# Patient Record
Sex: FEMALE | Race: WHITE | Employment: OTHER | URBAN - METROPOLITAN AREA
[De-identification: names, ages, dates, MRNs, and addresses within clinical notes are randomized per-mention and may not be internally consistent; named-entity substitution may affect disease eponyms.]

---

## 2017-04-21 PROBLEM — L40.3 PUSTULAR PSORIASIS OF PALMS AND SOLES: Status: ACTIVE | Noted: 2017-04-21

## 2017-08-22 NOTE — IMAGING NOTE
Pt is scheduled for LP with sedation for 8/28/17. Multiple msgs have been left on pt's vm since 8/17/17 by the PAT RN's, but no returned call. Pt does not have H&P on file in [facility].  I left her a voicemail today to verify if she had a H&P done by a physician

## 2017-08-24 NOTE — IMAGING NOTE
Spoke with patient. She has call into PCP to see if she can get addendum to most recent office visit. Otherwise she has scheduled apt tomorrow afternoon. I provided our fax and phone #'s.

## 2017-08-28 ENCOUNTER — HOSPITAL ENCOUNTER (OUTPATIENT)
Dept: GENERAL RADIOLOGY | Facility: HOSPITAL | Age: 57
Discharge: HOME | End: 2017-08-28
Attending: Other
Payer: COMMERCIAL

## 2017-08-28 ENCOUNTER — NURSE ONLY (OUTPATIENT)
Dept: LAB | Facility: HOSPITAL | Age: 57
End: 2017-08-28
Attending: Other
Payer: COMMERCIAL

## 2017-08-28 ENCOUNTER — APPOINTMENT (OUTPATIENT)
Dept: LAB | Facility: HOSPITAL | Age: 57
End: 2017-08-28
Attending: ORTHOPAEDIC SURGERY
Payer: COMMERCIAL

## 2017-08-28 VITALS
RESPIRATION RATE: 16 BRPM | TEMPERATURE: 98 F | HEIGHT: 68 IN | WEIGHT: 175 LBS | DIASTOLIC BLOOD PRESSURE: 70 MMHG | BODY MASS INDEX: 26.52 KG/M2 | HEART RATE: 58 BPM | SYSTOLIC BLOOD PRESSURE: 129 MMHG | OXYGEN SATURATION: 98 %

## 2017-08-28 DIAGNOSIS — G35 MS (MULTIPLE SCLEROSIS) (HCC): ICD-10-CM

## 2017-08-28 DIAGNOSIS — G35 MS (MULTIPLE SCLEROSIS) (HCC): Primary | ICD-10-CM

## 2017-08-28 LAB
ALBUMIN SERPL-MCNC: 4.1 G/DL (ref 3.5–4.8)
CLARITY CSF: CLEAR
COLOR CSF: COLORLESS
COUNT PERFORMED ON TUBE: 4
ERYTHROCYTE [DISTWIDTH] IN BLOOD BY AUTOMATED COUNT: 12.8 % (ref 11.5–16)
GLUCOSE CSF: 53 MG/DL (ref 40–70)
HCT VFR BLD AUTO: 40.8 % (ref 34–50)
HGB BLD-MCNC: 13.9 G/DL (ref 12–16)
IMMUNOGLOBULIN G: 1170 MG/DL (ref 791–1643)
MCH RBC QN AUTO: 30.9 PG (ref 27–33.2)
MCHC RBC AUTO-ENTMCNC: 34.1 G/DL (ref 31–37)
MCV RBC AUTO: 90.7 FL (ref 81–100)
PLATELET # BLD AUTO: 240 10(3)UL (ref 150–450)
RBC # BLD AUTO: 4.5 X10(6)UL (ref 3.8–5.1)
RBC CSF: 0 /MM3
RED CELL DISTRIBUTION WIDTH-SD: 42.1 FL (ref 35.1–46.3)
TOTAL PROTEIN CSF: 44.5 MG/DL (ref 15–45)
TOTAL VOLUME CSF: 8.5 ML
WBC # BLD AUTO: 8.3 X10(3) UL (ref 4–13)
WBC # FLD MANUAL: 1 /MM3 (ref 0–5)

## 2017-08-28 PROCEDURE — 62270 DX LMBR SPI PNXR: CPT | Performed by: OTHER

## 2017-08-28 PROCEDURE — 85027 COMPLETE CBC AUTOMATED: CPT | Performed by: RADIOLOGY

## 2017-08-28 PROCEDURE — 89050 BODY FLUID CELL COUNT: CPT | Performed by: OTHER

## 2017-08-28 PROCEDURE — 83883 ASSAY NEPHELOMETRY NOT SPEC: CPT | Performed by: ANESTHESIOLOGY

## 2017-08-28 PROCEDURE — 77003 FLUOROGUIDE FOR SPINE INJECT: CPT | Performed by: OTHER

## 2017-08-28 PROCEDURE — 36415 COLL VENOUS BLD VENIPUNCTURE: CPT

## 2017-08-28 PROCEDURE — 85610 PROTHROMBIN TIME: CPT

## 2017-08-28 PROCEDURE — 82945 GLUCOSE OTHER FLUID: CPT | Performed by: OTHER

## 2017-08-28 PROCEDURE — 84165 PROTEIN E-PHORESIS SERUM: CPT | Performed by: ANESTHESIOLOGY

## 2017-08-28 PROCEDURE — 86334 IMMUNOFIX E-PHORESIS SERUM: CPT | Performed by: ANESTHESIOLOGY

## 2017-08-28 PROCEDURE — 82040 ASSAY OF SERUM ALBUMIN: CPT | Performed by: OTHER

## 2017-08-28 PROCEDURE — 82042 OTHER SOURCE ALBUMIN QUAN EA: CPT | Performed by: ANESTHESIOLOGY

## 2017-08-28 PROCEDURE — 82784 ASSAY IGA/IGD/IGG/IGM EACH: CPT | Performed by: OTHER

## 2017-08-28 PROCEDURE — 82042 OTHER SOURCE ALBUMIN QUAN EA: CPT | Performed by: OTHER

## 2017-08-28 PROCEDURE — 82784 ASSAY IGA/IGD/IGG/IGM EACH: CPT | Performed by: ANESTHESIOLOGY

## 2017-08-28 PROCEDURE — 99152 MOD SED SAME PHYS/QHP 5/>YRS: CPT | Performed by: OTHER

## 2017-08-28 PROCEDURE — 84157 ASSAY OF PROTEIN OTHER: CPT | Performed by: ANESTHESIOLOGY

## 2017-08-28 PROCEDURE — 83916 OLIGOCLONAL BANDS: CPT | Performed by: ANESTHESIOLOGY

## 2017-08-28 PROCEDURE — 84157 ASSAY OF PROTEIN OTHER: CPT | Performed by: OTHER

## 2017-08-28 RX ORDER — NALOXONE HYDROCHLORIDE 0.4 MG/ML
80 INJECTION, SOLUTION INTRAMUSCULAR; INTRAVENOUS; SUBCUTANEOUS AS NEEDED
Status: DISCONTINUED | OUTPATIENT
Start: 2017-08-28 | End: 2017-09-01

## 2017-08-28 RX ORDER — MIDAZOLAM HYDROCHLORIDE 1 MG/ML
1 INJECTION INTRAMUSCULAR; INTRAVENOUS EVERY 5 MIN PRN
Status: ACTIVE | OUTPATIENT
Start: 2017-08-28 | End: 2017-08-28

## 2017-08-28 RX ORDER — ACETAMINOPHEN 500 MG
500 TABLET ORAL ONCE
Status: COMPLETED | OUTPATIENT
Start: 2017-08-28 | End: 2017-08-28

## 2017-08-28 RX ORDER — KETOROLAC TROMETHAMINE 30 MG/ML
30 INJECTION, SOLUTION INTRAMUSCULAR; INTRAVENOUS ONCE
Status: COMPLETED | OUTPATIENT
Start: 2017-08-28 | End: 2017-08-28

## 2017-08-28 RX ORDER — ACETAMINOPHEN 500 MG
TABLET ORAL
Status: DISPENSED
Start: 2017-08-28 | End: 2017-08-29

## 2017-08-28 RX ORDER — FLUMAZENIL 0.1 MG/ML
INJECTION, SOLUTION INTRAVENOUS
Status: DISCONTINUED
Start: 2017-08-28 | End: 2017-08-28 | Stop reason: WASHOUT

## 2017-08-28 RX ORDER — NALOXONE HYDROCHLORIDE 0.4 MG/ML
INJECTION, SOLUTION INTRAMUSCULAR; INTRAVENOUS; SUBCUTANEOUS
Status: DISCONTINUED
Start: 2017-08-28 | End: 2017-08-28 | Stop reason: WASHOUT

## 2017-08-28 RX ORDER — MIDAZOLAM HYDROCHLORIDE 1 MG/ML
INJECTION INTRAMUSCULAR; INTRAVENOUS
Status: COMPLETED
Start: 2017-08-28 | End: 2017-08-28

## 2017-08-28 RX ORDER — SODIUM CHLORIDE 9 MG/ML
INJECTION, SOLUTION INTRAVENOUS CONTINUOUS
Status: DISCONTINUED | OUTPATIENT
Start: 2017-08-28 | End: 2017-09-01

## 2017-08-28 RX ORDER — FLUMAZENIL 0.1 MG/ML
0.2 INJECTION, SOLUTION INTRAVENOUS AS NEEDED
Status: DISCONTINUED | OUTPATIENT
Start: 2017-08-28 | End: 2017-09-01

## 2017-08-28 RX ADMIN — SODIUM CHLORIDE: 9 INJECTION, SOLUTION INTRAVENOUS at 10:43:00

## 2017-08-28 RX ADMIN — KETOROLAC TROMETHAMINE 30 MG: 30 INJECTION, SOLUTION INTRAMUSCULAR; INTRAVENOUS at 13:14:00

## 2017-08-28 RX ADMIN — ACETAMINOPHEN 500 MG: 500 MG TABLET ORAL at 12:19:00

## 2017-08-28 RX ADMIN — MIDAZOLAM HYDROCHLORIDE 1 MG: 1 INJECTION INTRAMUSCULAR; INTRAVENOUS at 10:43:00

## 2017-08-28 NOTE — IMAGING NOTE
XR guided lumbar puncture with Dr. Bridgett Akers. Sedation per patient request. Vss. Pt tolerated well. Puncture site and bandage intact. Report to Moises dash RN. Transport to room 2249.

## 2017-08-28 NOTE — OR NURSING
12:45 PM - Upon assessing patient at [phone], patient rated pain 7/10. Patient states pain is a headache and has gotten worse since receiving Tylenol. LP site dry and intact.  Patient complains of light sensitivity, but does state this has been happening at [facility]

## 2017-08-29 LAB
ALBUMIN INDEX: 7.2 RATIO
ALBUMIN, CSF: 29 MG/DL
ALBUMIN, CSF: 31 MG/DL
ALBUMIN, SERUM/PLASMA, NEPH: 4290 MG/DL
CSF IGG SYNTHESIS RATE: <0 MG/D
CSF IGG/ALBUMIN RATIO: 0.13 RATIO
CSF IGG/ALBUMIN RATIO: 0.14 RATIO
IGG INDEX: 0.39 RATIO
IMMUNOGLOBULIN G CSF: 4.1 MG/DL
IMMUNOGLOBULIN G CSF: 4.1 MG/DL
IMMUNOGLOBULIN G: 1440 MG/DL

## 2017-08-30 LAB
A/G RATIO: 1.55
ALBUMIN, CSF: 24 MG/DL
ALBUMIN, SERUM: 4.68 G/DL (ref 3.5–4.8)
ALPHA-1 GLOBULIN: 0.15 G/DL (ref 0.1–0.3)
ALPHA-1, CSF: 1.1 MG/DL
ALPHA-2 GLOBULIN: 0.83 G/DL (ref 0.6–1)
ALPHA-2, CSF: 3.2 MG/DL
BETA GLOBULIN: 0.95 G/DL (ref 0.7–1.2)
BETA, CSF: 5.8 MG/DL
CSF BAND OLIGOCLONAL: POSITIVE
CSF OLIGOCLONAL BANDS NUMBER: 4 BANDS
GAMMA GLOBULIN: 1.08 G/DL (ref 0.6–1.6)
GAMMA, CSF: 3.5 MG/DL
KAPPA FREE LIGHT CHAIN: 2.46 MG/DL (ref 0.33–1.94)
KAPPA/LAMBDA FLC RATIO: 1.75 (ref 0.26–1.65)
LAMBDA FREE LIGHT CHAIN: 1.4 MG/DL (ref 0.57–2.63)
PRE-ALBUMIN, CSF: 1.3 MG/DL
TOTAL PROTEIN, CSF: 38.9 MG/DL
TOTAL PROTEIN,SERUM: 7.7 G/DL (ref 6.1–8.3)

## 2017-10-26 PROCEDURE — 88175 CYTOPATH C/V AUTO FLUID REDO: CPT | Performed by: OBSTETRICS & GYNECOLOGY

## 2017-10-26 PROCEDURE — 87624 HPV HI-RISK TYP POOLED RSLT: CPT | Performed by: OBSTETRICS & GYNECOLOGY

## 2017-10-26 PROCEDURE — 87086 URINE CULTURE/COLONY COUNT: CPT | Performed by: OBSTETRICS & GYNECOLOGY

## 2017-12-21 PROBLEM — M35.00 SECONDARY SJOGREN'S SYNDROME (HCC): Status: ACTIVE | Noted: 2017-12-21

## 2018-01-11 ENCOUNTER — OFFICE VISIT (OUTPATIENT)
Dept: NEUROLOGY | Facility: CLINIC | Age: 58
End: 2018-01-11

## 2018-01-11 ENCOUNTER — TELEPHONE (OUTPATIENT)
Dept: NEUROLOGY | Facility: CLINIC | Age: 58
End: 2018-01-11

## 2018-01-11 VITALS
RESPIRATION RATE: 16 BRPM | WEIGHT: 184 LBS | HEART RATE: 82 BPM | SYSTOLIC BLOOD PRESSURE: 133 MMHG | HEIGHT: 69 IN | BODY MASS INDEX: 27.25 KG/M2 | DIASTOLIC BLOOD PRESSURE: 79 MMHG

## 2018-01-11 DIAGNOSIS — G40.909 SEIZURE SYNDROME (HCC): Primary | ICD-10-CM

## 2018-01-11 DIAGNOSIS — G35 MS (MULTIPLE SCLEROSIS) (HCC): ICD-10-CM

## 2018-01-11 DIAGNOSIS — I77.6 VASCULITIS, CNS (HCC): ICD-10-CM

## 2018-01-11 PROCEDURE — 99204 OFFICE O/P NEW MOD 45 MIN: CPT | Performed by: OTHER

## 2018-01-11 RX ORDER — PSEUDOEPHEDRINE HYDROCHLORIDE 30 MG/1
30 TABLET ORAL EVERY 4 HOURS PRN
COMMUNITY

## 2018-01-11 NOTE — PROGRESS NOTES
[address] [place] [address] with [facility]  1/11/2018    2:55 PM      Cc:  Dizziness symptoms    HPI:  2014, had vertigo and had an MRI that showed white spots.  She meant feeling drunk it affected her balance and ling <=8.0 mg/d    CSF IGG/ALBUMIN RATIO      0.09 - 0.25 ratio    IGG INDEX      0.28 - 0.66 ratio    ALPHA-1, CSF      0.0 - 3.1 mg/dL    ALPHA-2, CSF      0.0 - 5.4 mg/dL    BETA, CSF      0.0 - 8.1 mg/dL    GAMMA, CSF      0.0 - 5.4 mg/dL    PRE-ALBUMIN, Take 25 mg by mouth every morning before breakfast., Disp: , Rfl:   •  ezetimibe 10 MG Oral Tab, Take 10 mg by mouth nightly., Disp: , Rfl:   •  Halobetasol Propionate 0.05 % External Cream, Apply topically 2 (two) times daily. 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

## 2018-01-11 NOTE — PATIENT INSTRUCTIONS
Refill policies:    • Allow 2-3 business days for refills; controlled substances may take longer.   • Contact your pharmacy at least 5 days prior to running out of medication and have them send an electronic request or submit request through the [facility] recommended that you have a procedure or additional testing performed. [facility]) will contact your insurance carrier to obtain pre-certification or prior authorization.     Unfortunately, [facility] has seen an increase in denial of paym

## 2018-01-18 ENCOUNTER — TELEPHONE (OUTPATIENT)
Dept: NEUROLOGY | Facility: CLINIC | Age: 58
End: 2018-01-18

## 2018-02-12 NOTE — TELEPHONE ENCOUNTER
MRI report reviewed, scanned to EPIC  Same periventricular spots seen on previous MRI     Dr Boo Roman

## 2018-04-23 ENCOUNTER — PATIENT MESSAGE (OUTPATIENT)
Dept: RHEUMATOLOGY | Facility: CLINIC | Age: 58
End: 2018-04-23

## 2018-04-23 NOTE — TELEPHONE ENCOUNTER
From: Rachel Jacobson  To: Bernardo Ramos MD  Sent: 4/23/2018 11:48 AM CDT  Subject: Other    Please cancel my appt on [email or web address].   Rachel Jacobson 4/18/1960

## 2018-04-30 ENCOUNTER — OFFICE VISIT (OUTPATIENT)
Dept: NEUROLOGY | Facility: CLINIC | Age: 58
End: 2018-04-30

## 2018-04-30 VITALS
WEIGHT: 184 LBS | HEART RATE: 88 BPM | HEIGHT: 69 IN | BODY MASS INDEX: 27.25 KG/M2 | SYSTOLIC BLOOD PRESSURE: 125 MMHG | DIASTOLIC BLOOD PRESSURE: 88 MMHG | RESPIRATION RATE: 16 BRPM

## 2018-04-30 DIAGNOSIS — M05.79 RHEUMATOID ARTHRITIS INVOLVING MULTIPLE SITES WITH POSITIVE RHEUMATOID FACTOR (HCC): Primary | ICD-10-CM

## 2018-04-30 DIAGNOSIS — G35 MS (MULTIPLE SCLEROSIS) (HCC): ICD-10-CM

## 2018-04-30 DIAGNOSIS — L40.50 PSORIATIC ARTHRITIS (HCC): ICD-10-CM

## 2018-04-30 PROCEDURE — 99214 OFFICE O/P EST MOD 30 MIN: CPT | Performed by: OTHER

## 2018-04-30 NOTE — PATIENT INSTRUCTIONS
Refill policies:    • Allow 2-3 business days for refills; controlled substances may take longer.   • Contact your pharmacy at least 5 days prior to running out of medication and have them send an electronic request or submit request through the “request re entire amount billed. Precertification and Prior Authorizations: If your physician has recommended that you have a procedure or additional testing performed.   [facility]) will contact your insurance carrier to obtain pre-certi

## 2018-04-30 NOTE — PROGRESS NOTES
[id number] [place] [address] with [facility]  4/30/2018    10:15 AM      Cc;  Possible MS    2-3 days at a time, she would not even get up. She would just come eat and not do anything and just be in bed.   Usually mental daily., Disp: 90 tablet, Rfl: 0  •  Cholecalciferol (VITAMIN D) 1000 UNITS Oral Tab, Take by mouth 2 (two) times daily. , Disp: , Rfl:   •  alprazolam 2 MG Oral Tab, Take 2 mg by mouth nightly as needed for Sleep., Disp: , Rfl:   •  TraMADol HCl 50 MG Oral mg/d    CSF IGG/ALBUMIN RATIO      0.09 - 0.25 ratio 0.14   IGG INDEX      0.28 - 0.66 ratio    CSF BAND OLIGOCLONAL          CSF OLIGOCLONAL BANDS NUMBER      0 - 1 Bands    CSF OLIG INTERPRETATION            Component      Latest Ref Rng & Units 8/28/201 were placed in this encounter.       RTC 3 months      Dr Mariia Garcias   Neurology   Director of the [facility]

## 2018-05-03 ENCOUNTER — TELEPHONE (OUTPATIENT)
Dept: NEUROLOGY | Facility: CLINIC | Age: 58
End: 2018-05-03

## 2018-05-03 NOTE — TELEPHONE ENCOUNTER
S: Calling with 2 incidents of lost time    B: LOV 4/30 with Dr. Gustavo Rogers with notes:    I will discuss situation with Rheumatology and Psychiatry  If needed, we do an empiric trial of Solumedrol 500 mg daily for 3 days and see what symptoms improves (fol

## 2018-05-08 ENCOUNTER — TELEPHONE (OUTPATIENT)
Dept: NEUROLOGY | Facility: CLINIC | Age: 58
End: 2018-05-08

## 2018-05-08 DIAGNOSIS — M05.79 RHEUMATOID ARTHRITIS INVOLVING MULTIPLE SITES WITH POSITIVE RHEUMATOID FACTOR (HCC): Primary | ICD-10-CM

## 2018-05-09 NOTE — TELEPHONE ENCOUNTER
Pt's 2nd attempt to speak with nurse/provider. Would like return call from RN states that it is an emergency that she speaks with nurse or physician.

## 2018-05-09 NOTE — TELEPHONE ENCOUNTER
Patient calling with updates since TE on 5/3/18. Patient reports has been falling a lot in the past 6 days and can not control legs, left more than right. Patient is very difficult to follow in a conversation and admits to noticing her speech difficulty.

## 2018-05-10 ENCOUNTER — NURSE ONLY (OUTPATIENT)
Dept: NEUROLOGY | Facility: CLINIC | Age: 58
End: 2018-05-10

## 2018-05-10 VITALS — HEART RATE: 76 BPM | SYSTOLIC BLOOD PRESSURE: 112 MMHG | DIASTOLIC BLOOD PRESSURE: 70 MMHG

## 2018-05-10 DIAGNOSIS — G89.4 CHRONIC PAIN SYNDROME: Primary | ICD-10-CM

## 2018-05-10 PROCEDURE — 96365 THER/PROPH/DIAG IV INF INIT: CPT | Performed by: OTHER

## 2018-05-10 RX ORDER — PREDNISONE 20 MG/1
TABLET ORAL
Qty: 30 TABLET | Refills: 0 | Status: SHIPPED | OUTPATIENT
Start: 2018-05-10 | End: 2018-06-08

## 2018-05-10 RX ORDER — METHYLPREDNISOLONE SODIUM SUCCINATE 500 MG/1
500 INJECTION, POWDER, FOR SOLUTION INTRAMUSCULAR; INTRAVENOUS ONCE
Status: COMPLETED | OUTPATIENT
Start: 2018-05-10 | End: 2018-05-10

## 2018-05-10 RX ADMIN — METHYLPREDNISOLONE SODIUM SUCCINATE 500 MG: 500 INJECTION, POWDER, FOR SOLUTION INTRAMUSCULAR; INTRAVENOUS at 14:18:00

## 2018-05-10 NOTE — TELEPHONE ENCOUNTER
Orders faxed to [facility], confirmation rec'd. Clarified with [facility] staff; patient will have 3:30 infusion tomorrow and 11:30 Saturday. LM with patient relaying times.

## 2018-05-10 NOTE — TELEPHONE ENCOUNTER
Per Dr. Leticia Holm patient will need to complete remaining 2 days' infusions at [facility]. Will request benefits check.

## 2018-05-10 NOTE — PROGRESS NOTES
IV started per Dr. Chanda Fleming in [facility] and Solu-Medrol infused over 20 minutes without difficulty. IV discontinued per RN. Patient tolerated infusion well with no complications.

## 2018-05-10 NOTE — TELEPHONE ENCOUNTER
Spoke to Keyla at [employer], codes -42615 are valid and billable, no prior authorization or predetermination required.  Call reference: [phone] call time 10:15

## 2018-05-10 NOTE — PATIENT INSTRUCTIONS
Refill policies:    • Allow 2-3 business days for refills; controlled substances may take longer.   • Contact your pharmacy at least 5 days prior to running out of medication and have them send an electronic request or submit request through the “request re entire amount billed. Precertification and Prior Authorizations: If your physician has recommended that you have a procedure or additional testing performed.   [facility]) will contact your insurance carrier to obtain pre-certi

## 2018-05-15 ENCOUNTER — TELEPHONE (OUTPATIENT)
Dept: NEUROLOGY | Facility: CLINIC | Age: 58
End: 2018-05-15

## 2018-05-15 NOTE — TELEPHONE ENCOUNTER
Noted would not have stopped it but give Ranitidine or pepcid. Can you call and ask what improved and what symptoms returned,.   Dr Tres Nagel

## 2018-05-15 NOTE — TELEPHONE ENCOUNTER
Patient advised to take pepcid. She states she was being optimistic about improvement, did have more energy after solumedrol but now is noticing speech is more slurred, foggy mind, and more difficulty walking.

## 2018-05-15 NOTE — TELEPHONE ENCOUNTER
Patient reports stomach pains and has stopped oral prednisone taper on Sunday. Reports symptoms have returned.

## 2018-05-16 NOTE — TELEPHONE ENCOUNTER
Tell her the biologic effect of steroid lingers for 30+ days and so just make note of symptoms and lets see how she is doing on her next visit.   Schedule follow up in 3 months    Dr Kacey Hidalgo

## 2018-05-16 NOTE — TELEPHONE ENCOUNTER
LMTCB. Upon call back, please relay doctors recommendations and assist patient in scheduling a follow up appointment.

## 2018-05-18 ENCOUNTER — TELEPHONE (OUTPATIENT)
Dept: NEUROLOGY | Facility: CLINIC | Age: 58
End: 2018-05-18

## 2018-05-18 DIAGNOSIS — R29.898 LEG WEAKNESS, BILATERAL: Primary | ICD-10-CM

## 2018-05-18 DIAGNOSIS — G37.9 DEMYELINATING DISEASE OF CENTRAL NERVOUS SYSTEM (HCC): ICD-10-CM

## 2018-05-18 NOTE — TELEPHONE ENCOUNTER
S/B: Symptoms that are unclear in origin; consulted with Dr. Homar Aldridge 4/30 with notes:    will discuss situation with Rheumatology and Psychiatry  If needed, we do an empiric trial of Solumedrol 500 mg daily for 3 days and see what symptoms improves (foll

## 2018-05-18 NOTE — TELEPHONE ENCOUNTER
Spoke with patient  Does not make sense  Non-response means NOT inflammatory   Maybe NOT MS even  Or maybe all psychological    Repeat MRI brain and Thoracic CORD  See her next week

## 2018-05-22 ENCOUNTER — TELEPHONE (OUTPATIENT)
Dept: NEUROLOGY | Facility: CLINIC | Age: 58
End: 2018-05-22

## 2018-05-24 NOTE — TELEPHONE ENCOUNTER
LMTCB to fill cancellation spot for tomorrow; slot not on hold and per message patient informed it is not on hold.

## 2018-05-30 NOTE — TELEPHONE ENCOUNTER
Patient never responded to call for appointment; when she returns call will fit in to appropriate open slot. Per Dr. Elizabeth Yi no need to double book at this time.

## 2018-06-01 ENCOUNTER — TELEPHONE (OUTPATIENT)
Dept: NEUROLOGY | Facility: CLINIC | Age: 58
End: 2018-06-01

## 2018-06-01 DIAGNOSIS — R29.898 BILATERAL LEG WEAKNESS: Primary | ICD-10-CM

## 2018-06-01 NOTE — TELEPHONE ENCOUNTER
Madison You wishing to complete MRI studies at [facility] but needs to have them done under IV sedation, per patient. Will confirm with Dr. Judson Toro that this is acceptable and place order.

## 2018-06-05 NOTE — TELEPHONE ENCOUNTER
Left message on Cici,central scheduling, voicemail stating the order was placed for MRI with sedation was placed and to contact the office if she needs further clarification. Provided office number.

## 2018-06-05 NOTE — TELEPHONE ENCOUNTER
Pt needs orders for MRI to state IV sedation, please call Cici at central scheduling to let her know when changes have been made so they can call pt to schedule

## 2018-06-08 RX ORDER — SODIUM CHLORIDE, SODIUM LACTATE, POTASSIUM CHLORIDE, CALCIUM CHLORIDE 600; 310; 30; 20 MG/100ML; MG/100ML; MG/100ML; MG/100ML
INJECTION, SOLUTION INTRAVENOUS CONTINUOUS
Status: CANCELLED | OUTPATIENT
Start: 2018-06-08

## 2018-06-08 RX ORDER — ACETAMINOPHEN 500 MG
1000 TABLET ORAL ONCE
Status: CANCELLED | OUTPATIENT
Start: 2018-06-08 | End: 2018-06-08

## 2018-06-22 ENCOUNTER — ANESTHESIA (OUTPATIENT)
Dept: MRI IMAGING | Facility: HOSPITAL | Age: 58
End: 2018-06-22
Payer: COMMERCIAL

## 2018-06-22 ENCOUNTER — ANESTHESIA EVENT (OUTPATIENT)
Dept: MRI IMAGING | Facility: HOSPITAL | Age: 58
End: 2018-06-22
Payer: COMMERCIAL

## 2018-06-22 ENCOUNTER — HOSPITAL ENCOUNTER (OUTPATIENT)
Dept: MRI IMAGING | Facility: HOSPITAL | Age: 58
Discharge: HOME | End: 2018-06-22
Attending: Other
Payer: COMMERCIAL

## 2018-06-22 ENCOUNTER — APPOINTMENT (OUTPATIENT)
Dept: LAB | Facility: HOSPITAL | Age: 58
End: 2018-06-22
Attending: Other
Payer: COMMERCIAL

## 2018-06-22 VITALS
WEIGHT: 182 LBS | RESPIRATION RATE: 18 BRPM | BODY MASS INDEX: 27.58 KG/M2 | TEMPERATURE: 98 F | DIASTOLIC BLOOD PRESSURE: 80 MMHG | HEART RATE: 82 BPM | OXYGEN SATURATION: 100 % | SYSTOLIC BLOOD PRESSURE: 116 MMHG | HEIGHT: 68 IN

## 2018-06-22 DIAGNOSIS — R29.898 BILATERAL LEG WEAKNESS: ICD-10-CM

## 2018-06-22 PROCEDURE — A9576 INJ PROHANCE MULTIPACK: HCPCS | Performed by: OTHER

## 2018-06-22 PROCEDURE — 70553 MRI BRAIN STEM W/O & W/DYE: CPT | Performed by: OTHER

## 2018-06-22 PROCEDURE — 72157 MRI CHEST SPINE W/O & W/DYE: CPT | Performed by: OTHER

## 2018-06-22 RX ORDER — HYDROMORPHONE HYDROCHLORIDE 1 MG/ML
0.4 INJECTION, SOLUTION INTRAMUSCULAR; INTRAVENOUS; SUBCUTANEOUS EVERY 5 MIN PRN
Status: ACTIVE | OUTPATIENT
Start: 2018-06-22 | End: 2018-06-22

## 2018-06-22 RX ORDER — ONDANSETRON 2 MG/ML
4 INJECTION INTRAMUSCULAR; INTRAVENOUS AS NEEDED
Status: ACTIVE | OUTPATIENT
Start: 2018-06-22 | End: 2018-06-22

## 2018-06-22 RX ORDER — HYDROCODONE BITARTRATE AND ACETAMINOPHEN 5; 325 MG/1; MG/1
2 TABLET ORAL AS NEEDED
Status: DISCONTINUED | OUTPATIENT
Start: 2018-06-22 | End: 2018-06-24

## 2018-06-22 RX ORDER — SODIUM CHLORIDE, SODIUM LACTATE, POTASSIUM CHLORIDE, CALCIUM CHLORIDE 600; 310; 30; 20 MG/100ML; MG/100ML; MG/100ML; MG/100ML
INJECTION, SOLUTION INTRAVENOUS CONTINUOUS
Status: DISCONTINUED | OUTPATIENT
Start: 2018-06-22 | End: 2018-06-24

## 2018-06-22 RX ORDER — HYDROCODONE BITARTRATE AND ACETAMINOPHEN 5; 325 MG/1; MG/1
1 TABLET ORAL AS NEEDED
Status: DISCONTINUED | OUTPATIENT
Start: 2018-06-22 | End: 2018-06-24

## 2018-06-22 RX ORDER — NALOXONE HYDROCHLORIDE 0.4 MG/ML
80 INJECTION, SOLUTION INTRAMUSCULAR; INTRAVENOUS; SUBCUTANEOUS AS NEEDED
Status: ACTIVE | OUTPATIENT
Start: 2018-06-22 | End: 2018-06-22

## 2018-06-22 RX ORDER — DEXAMETHASONE SODIUM PHOSPHATE 4 MG/ML
4 VIAL (ML) INJECTION AS NEEDED
Status: ACTIVE | OUTPATIENT
Start: 2018-06-22 | End: 2018-06-22

## 2018-06-22 RX ORDER — METOCLOPRAMIDE HYDROCHLORIDE 5 MG/ML
10 INJECTION INTRAMUSCULAR; INTRAVENOUS AS NEEDED
Status: ACTIVE | OUTPATIENT
Start: 2018-06-22 | End: 2018-06-22

## 2018-06-22 NOTE — ANESTHESIA POSTPROCEDURE EVALUATION
[address] Patient Status:  Outpatient   Age/Gender 63year old female MRN [id number]   Location [facility] MRI Attending Prince Liu MD   Hosp Day # 0 PCP Donnelly Saint, DO       Anesthesia Post-op Note    * No

## 2018-06-22 NOTE — ANESTHESIA PREPROCEDURE EVALUATION
PRE-OP EVALUATION    Patient Name: Brooklyn Coyle    Pre-op Diagnosis: * No pre-op diagnosis entered *    * No procedures listed *    * No surgeons found in log *    Pre-op vitals reviewed.   Temp: 97.6 °F (36.4 °C)  Pulse: 83  Resp: 21  BP: 112/85 Antibiotics      Anesthesia Evaluation    Patient summary reviewed. Anesthetic Complications           GI/Hepatic/Renal                                 Cardiovascular    Negative cardiovascular ROS.                                                    Endo

## 2018-06-22 NOTE — IMAGING NOTE
VS pre MRI stable on RA. Anesthesiologist discussed MRI anesthesia with patient and her spouse Agnes Paos, questions answered. Patient transported by MRI staff via cart with spouse at side.

## 2018-06-25 ENCOUNTER — TELEPHONE (OUTPATIENT)
Dept: NEUROLOGY | Facility: CLINIC | Age: 58
End: 2018-06-25

## 2018-06-28 ENCOUNTER — TELEPHONE (OUTPATIENT)
Dept: NEUROLOGY | Facility: CLINIC | Age: 58
End: 2018-06-28

## 2018-06-28 NOTE — TELEPHONE ENCOUNTER
Spoke with Dr Anushka Leach and also the patient      Current Outpatient Prescriptions:   •  BuPROPion HCl ER, SR, 150 MG Oral Tablet 12 Hr, TK 2 TS PO QAM AND TK 1 T PO QD AT 4PM, Disp: , Rfl: 1  •  Halobetasol Propionate 0.05 % External Ointment, Apply to affecte [address]  6/28/2018, Time completed 3:42 PM

## 2018-07-11 ENCOUNTER — OFFICE VISIT (OUTPATIENT)
Dept: NEUROLOGY | Facility: CLINIC | Age: 58
End: 2018-07-11

## 2018-07-11 VITALS
HEIGHT: 69 IN | RESPIRATION RATE: 16 BRPM | HEART RATE: 78 BPM | BODY MASS INDEX: 27.11 KG/M2 | SYSTOLIC BLOOD PRESSURE: 130 MMHG | WEIGHT: 183 LBS | DIASTOLIC BLOOD PRESSURE: 80 MMHG

## 2018-07-11 DIAGNOSIS — R83.8 OLIGOCLONAL BANDS IN CEREBROSPINAL FLUID: ICD-10-CM

## 2018-07-11 DIAGNOSIS — L40.9 PSORIASIS: ICD-10-CM

## 2018-07-11 DIAGNOSIS — R90.89 ABNORMAL FINDING ON MRI OF BRAIN: Primary | ICD-10-CM

## 2018-07-11 DIAGNOSIS — M06.032 RHEUMATOID ARTHRITIS INVOLVING BOTH WRISTS WITH NEGATIVE RHEUMATOID FACTOR (HCC): ICD-10-CM

## 2018-07-11 DIAGNOSIS — M06.031 RHEUMATOID ARTHRITIS INVOLVING BOTH WRISTS WITH NEGATIVE RHEUMATOID FACTOR (HCC): ICD-10-CM

## 2018-07-11 PROCEDURE — 99214 OFFICE O/P EST MOD 30 MIN: CPT | Performed by: OTHER

## 2018-07-11 NOTE — PROGRESS NOTES
[facility] with [facility]  Dennys Coyle  4/19/1960  Primary Care Provider:  Star Rondon DO    7/11/2018  Accompanied visit:  ( x) No () yes    63year old yo patient being seen fo needed for congestion. , Disp: , Rfl:   •  Acitretin 25 MG Oral Cap, Take 25 mg by mouth every morning before breakfast., Disp: , Rfl:   •  ezetimibe 10 MG Oral Tab, Take 10 mg by mouth nightly., Disp: , Rfl:   •  Halobetasol Propionate 0.05 % External Crea the diagnosis of demyelinating disease. It is hard to satisfy the Aiken criteria in making the diagnosis.   I encouraged her to get the second opinion from various MS expert who are in [facility] to see whether we can gather some consensus on the diagnosis when patient requests privacy

## 2018-07-11 NOTE — PATIENT INSTRUCTIONS
Refill policies:    • Allow 2-3 business days for refills; controlled substances may take longer.   • Contact your pharmacy at least 5 days prior to running out of medication and have them send an electronic request or submit request through the “request re entire amount billed. Precertification and Prior Authorizations: If your physician has recommended that you have a procedure or additional testing performed.   [facility]) will contact your insurance carrier to obtain pre-certi

## 2018-07-17 ENCOUNTER — APPOINTMENT (OUTPATIENT)
Dept: MRI IMAGING | Facility: HOSPITAL | Age: 58
End: 2018-07-17
Attending: Other
Payer: COMMERCIAL

## 2018-07-17 ENCOUNTER — HOSPITAL ENCOUNTER (OUTPATIENT)
Dept: MRI IMAGING | Facility: HOSPITAL | Age: 58
Discharge: HOME | End: 2018-07-17
Attending: Other
Payer: COMMERCIAL

## 2018-07-17 ENCOUNTER — APPOINTMENT (OUTPATIENT)
Dept: LAB | Facility: HOSPITAL | Age: 58
End: 2018-07-17
Attending: Other
Payer: COMMERCIAL

## 2018-07-18 ENCOUNTER — TELEPHONE (OUTPATIENT)
Dept: NEUROLOGY | Facility: CLINIC | Age: 58
End: 2018-07-18

## 2018-09-19 ENCOUNTER — TELEPHONE (OUTPATIENT)
Dept: NEUROLOGY | Facility: CLINIC | Age: 58
End: 2018-09-19

## 2018-10-19 ENCOUNTER — TELEPHONE (OUTPATIENT)
Dept: NEUROLOGY | Facility: CLINIC | Age: 58
End: 2018-10-19

## 2018-10-19 NOTE — TELEPHONE ENCOUNTER
Consult notes from [facility] Harmony MARTINEZ received, endorsed to Dr. Thuy Mcfarland for review.

## 2018-11-01 PROCEDURE — 87624 HPV HI-RISK TYP POOLED RSLT: CPT | Performed by: OBSTETRICS & GYNECOLOGY

## 2018-11-01 PROCEDURE — 88175 CYTOPATH C/V AUTO FLUID REDO: CPT | Performed by: OBSTETRICS & GYNECOLOGY

## 2018-12-03 NOTE — TELEPHONE ENCOUNTER
Spoke with Dr Desi Urena who agrees patient might have MS and treatment would then be Tecfidera. Since sh has possible Rheumatoid Arthritis, if she needs to be on treatment for this, he suggested Clement.   Otherwise Rituximab is an option or MTX 10 mg once

## 2018-12-05 ENCOUNTER — OFFICE VISIT (OUTPATIENT)
Dept: NEUROLOGY | Facility: CLINIC | Age: 58
End: 2018-12-05
Payer: COMMERCIAL

## 2018-12-05 VITALS
RESPIRATION RATE: 16 BRPM | HEIGHT: 69 IN | WEIGHT: 205 LBS | DIASTOLIC BLOOD PRESSURE: 82 MMHG | SYSTOLIC BLOOD PRESSURE: 125 MMHG | BODY MASS INDEX: 30.36 KG/M2 | HEART RATE: 82 BPM

## 2018-12-05 DIAGNOSIS — L40.50 PSORIATIC ARTHRITIS (HCC): ICD-10-CM

## 2018-12-05 DIAGNOSIS — M06.031 RHEUMATOID ARTHRITIS INVOLVING BOTH WRISTS WITH NEGATIVE RHEUMATOID FACTOR (HCC): ICD-10-CM

## 2018-12-05 DIAGNOSIS — G35 MS (MULTIPLE SCLEROSIS) (HCC): ICD-10-CM

## 2018-12-05 DIAGNOSIS — M06.032 RHEUMATOID ARTHRITIS INVOLVING BOTH WRISTS WITH NEGATIVE RHEUMATOID FACTOR (HCC): ICD-10-CM

## 2018-12-05 DIAGNOSIS — R83.8 OLIGOCLONAL BANDS IN CEREBROSPINAL FLUID: ICD-10-CM

## 2018-12-05 DIAGNOSIS — G37.9 DEMYELINATING DISEASE OF CENTRAL NERVOUS SYSTEM (HCC): Primary | ICD-10-CM

## 2018-12-05 PROCEDURE — 99214 OFFICE O/P EST MOD 30 MIN: CPT | Performed by: OTHER

## 2018-12-05 NOTE — PROGRESS NOTES
[id number] [place] [address] with [facility]  12/5/2018    4:29 PM      >50% of the time allotted for today's visit was spent on counseling and discussing options and care plan.        This is a disclosure care plan meet suffering from a painful tailbone at this time      /82 (BP Location: Left arm, Patient Position: Sitting, Cuff Size: adult)   Pulse 82   Resp 16   Ht 69\"   Wt 205 lb   LMP 03/19/2010   BMI 30.27 kg/m²    Appears stated age  HENT:  Pink conjunctiva,

## 2018-12-06 ENCOUNTER — TELEPHONE (OUTPATIENT)
Dept: NEUROLOGY | Facility: CLINIC | Age: 58
End: 2018-12-06

## 2018-12-06 NOTE — TELEPHONE ENCOUNTER
Patient presented to office for follow up visit and discussion of DMA. Patient to be started on Tecfidera. Prior authorization initiated, start up paperwork faxed to [employer]. Prior authorization initiated through cover my meds.     Case Key # [id number]

## 2018-12-11 NOTE — TELEPHONE ENCOUNTER
Fax received from [facility]. Patient's request for Tecfidera has been approved. Approval effective 12/6/2018 through 12/6/2020.     Start up forms faxed to [employer]

## 2018-12-12 ENCOUNTER — TELEPHONE (OUTPATIENT)
Dept: NEUROLOGY | Facility: CLINIC | Age: 58
End: 2018-12-12

## 2018-12-12 DIAGNOSIS — G35 MS (MULTIPLE SCLEROSIS) (HCC): Primary | ICD-10-CM

## 2018-12-12 RX ORDER — DIMETHYL FUMARATE 120 MG/1
120 CAPSULE ORAL 2 TIMES DAILY
Qty: 14 CAPSULE | Refills: 0 | Status: SHIPPED | OUTPATIENT
Start: 2018-12-12 | End: 2018-12-19

## 2018-12-12 RX ORDER — DIMETHYL FUMARATE 240 MG/1
240 CAPSULE ORAL 2 TIMES DAILY
Qty: 60 CAPSULE | Refills: 0 | Status: SHIPPED | OUTPATIENT
Start: 2018-12-12 | End: 2018-12-26

## 2018-12-12 NOTE — TELEPHONE ENCOUNTER
Pt called asking for Tecfidera to be ordered and sent to [facility].   Order is pending Dr. Kermit Lackey approval    Routing to Dr. Gilda Jarvis

## 2018-12-21 ENCOUNTER — TELEPHONE (OUTPATIENT)
Dept: NEUROLOGY | Facility: CLINIC | Age: 58
End: 2018-12-21

## 2018-12-26 DIAGNOSIS — G35 MS (MULTIPLE SCLEROSIS) (HCC): ICD-10-CM

## 2018-12-26 RX ORDER — DIMETHYL FUMARATE 240 MG/1
240 CAPSULE ORAL 2 TIMES DAILY
Qty: 60 CAPSULE | Refills: 2 | Status: SHIPPED | OUTPATIENT
Start: 2018-12-26 | End: 2019-01-25

## 2018-12-26 NOTE — TELEPHONE ENCOUNTER
Incoming fax asking for refill on Tecfidera, both 120 & 240mg dosing. Will sent up Rx for 240mg, 120mg was initial dosing for first 7 days.      Medication: Tecfidera     Date of last refill: 12/12/18 for #60/0 additional refills  [place]

## 2019-04-02 ENCOUNTER — OFFICE VISIT (OUTPATIENT)
Dept: NEUROLOGY | Facility: CLINIC | Age: 59
End: 2019-04-02
Payer: COMMERCIAL

## 2019-04-02 VITALS
HEIGHT: 69 IN | DIASTOLIC BLOOD PRESSURE: 77 MMHG | HEART RATE: 78 BPM | WEIGHT: 205 LBS | RESPIRATION RATE: 16 BRPM | SYSTOLIC BLOOD PRESSURE: 130 MMHG | BODY MASS INDEX: 30.36 KG/M2

## 2019-04-02 DIAGNOSIS — M06.9 RHEUMATOID ARTHRITIS INVOLVING MULTIPLE JOINTS (HCC): ICD-10-CM

## 2019-04-02 DIAGNOSIS — L40.50 PSORIATIC ARTHRITIS (HCC): ICD-10-CM

## 2019-04-02 DIAGNOSIS — M79.7 FIBROMYALGIA: Primary | ICD-10-CM

## 2019-04-02 DIAGNOSIS — G37.9 DEMYELINATING DISEASE OF CENTRAL NERVOUS SYSTEM (HCC): ICD-10-CM

## 2019-04-02 DIAGNOSIS — L40.9 PSORIASIS: ICD-10-CM

## 2019-04-02 PROCEDURE — 99214 OFFICE O/P EST MOD 30 MIN: CPT | Performed by: OTHER

## 2019-04-02 RX ORDER — DIMETHYL FUMARATE 240 MG/1
1 CAPSULE ORAL 2 TIMES DAILY
COMMUNITY
End: 2019-08-27

## 2019-04-02 NOTE — PROGRESS NOTES
[employer] with [facility]  4/19/1960  Primary Care Provider:  Delma Dawson DO    4/2/2019  Accompanied visit:  ( ) No (x) yes, by: .   PSR is nearby during the daily, Disp: , Rfl: 3  •  VYVANSE 70 MG Oral Cap, TK ONE C PO  QAM, Disp: , Rfl: 0  •  TraMADol HCl 50 MG Oral Tab, TAKE 1 TABLET BY MOUTH EVERY 3 HOURS AS NEEDED FOR PAIN, Disp: , Rfl:   •  Pseudoephedrine HCl 30 MG Oral Tab, Take 30 mg by mouth every 4 ( Elroy, he recommended using Tecfidera with the Leflunomide    Diagnostics/Orders:  Schedule EMG of arms and legs      (x) Discussed potential side effects of any treatment relevant to above.     Follow up, in approximately:  ( ) 6-8 weeks    ( ) 3-4 mon

## 2019-07-29 ENCOUNTER — TELEPHONE (OUTPATIENT)
Dept: NEUROLOGY | Facility: CLINIC | Age: 59
End: 2019-07-29

## 2019-07-29 NOTE — TELEPHONE ENCOUNTER
Pt scheduled 4 ext EMG for 8/20/19. She no showed in April because she was not aware of the appt. She is having severe spasms and is falling down frequently.   Wants to know if she should come int to see Dr. Delinda Angelucci or see the [facility] specialist he had ref

## 2019-07-29 NOTE — TELEPHONE ENCOUNTER
Talked with Ever Reyes. In Last 6 months her symptoms are getting worse. She is c/o of spasm and jerking in both her legs. She cannot stand still. She keeps falling backward on the floor. She states \" she cannot control her limbs\".  She cannot see clearly f

## 2019-07-30 NOTE — TELEPHONE ENCOUNTER
Spoke wih patient, does not want steroid, suggested IVIG but laughed when I told her it is an expensive treatment  Advised to keep appointment on August

## 2019-08-16 ENCOUNTER — TELEPHONE (OUTPATIENT)
Dept: NEUROLOGY | Facility: CLINIC | Age: 59
End: 2019-08-16

## 2019-08-16 NOTE — TELEPHONE ENCOUNTER
Left 2nd message cancelling pt's 8/20/19 EMG test due to provider schedule change. Please CB on Monday to r/s with another provider.

## 2019-08-22 ENCOUNTER — TELEPHONE (OUTPATIENT)
Dept: NEUROLOGY | Facility: CLINIC | Age: 59
End: 2019-08-22

## 2019-08-22 NOTE — TELEPHONE ENCOUNTER
Received surgical clearance form in regards to MS and seizures. Pt having posterior colporraphy on 8/28/19. Form placed in RN bin.

## 2019-08-23 PROCEDURE — 87086 URINE CULTURE/COLONY COUNT: CPT | Performed by: INTERNAL MEDICINE

## 2019-08-23 NOTE — TELEPHONE ENCOUNTER
Per Jenni: please clarify patient's seizure episodes. Patient reports \"episodes\" that are non-epileptic. They are continuing to occur but she has not had any for over a week. She does not have any medication to treat these conditions.     Sometimes

## 2019-08-26 NOTE — TELEPHONE ENCOUNTER
Pt is having surgery Wednesday, waiting for clearance letter to [address] [place] [address], please update [phone]

## 2019-08-26 NOTE — TELEPHONE ENCOUNTER
Received clearance letter for surgery from Dr Gino Alston. Faxed to [phone] to [address] [place] [address]. Attn: Shahab Garcia. Fax confirmation received. Copy of letter send for scanning.

## 2019-08-30 ENCOUNTER — TELEPHONE (OUTPATIENT)
Dept: NEUROLOGY | Facility: CLINIC | Age: 59
End: 2019-08-30

## 2019-08-30 NOTE — TELEPHONE ENCOUNTER
Pt is having surgery (colporraphy) on 9/11/19. Is scheduled for 4 ext EMG with Dr. Geno Watkins on 9/12/19. Asking if we have a sooner appointment available.   Advised pt nothing sooner on his schedule but recommended she reschedule to the week of September 2

## 2019-09-12 ENCOUNTER — TELEPHONE (OUTPATIENT)
Dept: NEUROLOGY | Facility: CLINIC | Age: 59
End: 2019-09-12

## 2019-09-12 NOTE — TELEPHONE ENCOUNTER
Spoke with patient's , patient was extremely exhausted prior to procedure at [facility] on 9/11/2019 and had a seizure prior to procedure. Patient went to ER. After resting all night, patient's spouse reports that patient is feeling much better.

## 2019-09-12 NOTE — TELEPHONE ENCOUNTER
Patient's spouse called and RN informed him of the following information;     Note      Yes Dr Stef Washington called me and we decided to put her on low dose Keppra 250 mg BID  Attempted to call Lanie Barkley but got voicemail  I suspect that it was a precipitated event li

## 2019-09-12 NOTE — TELEPHONE ENCOUNTER
Yes Dr Sumanth Wilson called me and we decided to put her on low dose Keppra 250 mg BID  Attempted to call Rafaela Mueller but got voicemail  I suspect that it was a precipitated event like sleep deprivation  I will reach out today and get more details   Decision is whether

## 2019-09-24 ENCOUNTER — TELEPHONE (OUTPATIENT)
Dept: NEUROLOGY | Facility: CLINIC | Age: 59
End: 2019-09-24

## 2019-10-21 ENCOUNTER — TELEPHONE (OUTPATIENT)
Dept: NEUROLOGY | Facility: CLINIC | Age: 59
End: 2019-10-21

## 2019-10-21 NOTE — TELEPHONE ENCOUNTER
Spoke with the patient. She is frustrated that she continues to have symptoms. She only took Tecfidera for 1 1/2 month,  She is still on Leflunomide which is metabolized into Teriflunomide (Aubagio).   She did not have good experience with steroid,  She i

## 2019-12-02 ENCOUNTER — PROCEDURE VISIT (OUTPATIENT)
Dept: NEUROLOGY | Facility: CLINIC | Age: 59
End: 2019-12-02
Payer: COMMERCIAL

## 2019-12-02 ENCOUNTER — TELEPHONE (OUTPATIENT)
Dept: NEUROLOGY | Facility: CLINIC | Age: 59
End: 2019-12-02

## 2019-12-02 DIAGNOSIS — R29.898 BILATERAL LEG WEAKNESS: ICD-10-CM

## 2019-12-02 PROCEDURE — 95910 NRV CNDJ TEST 7-8 STUDIES: CPT | Performed by: OTHER

## 2019-12-02 PROCEDURE — 95885 MUSC TST DONE W/NERV TST LIM: CPT | Performed by: OTHER

## 2019-12-02 RX ORDER — LEVETIRACETAM 500 MG/1
500 TABLET ORAL 2 TIMES DAILY
Qty: 60 TABLET | Refills: 5 | Status: SHIPPED | OUTPATIENT
Start: 2019-12-02 | End: 2019-12-04

## 2019-12-02 NOTE — TELEPHONE ENCOUNTER
She is ok at this time but she stated we need to call her pharmacy because this is the first time she is using them

## 2019-12-02 NOTE — TELEPHONE ENCOUNTER
RN called the patient to verify what she needing regarding the pharmacy. Pt verified she needs her Keppra called into the  [address] RN informed the MD that he needs to order the medication.

## 2019-12-02 NOTE — TELEPHONE ENCOUNTER
Called pt to let her know she left her brown book in the lobby area. Placed book in medication/money drawer.

## 2019-12-02 NOTE — PROGRESS NOTES
[address] [place] [address] with [facility]  12/2/2019    1:53 PM      Here for EMG and sampled the right arm and leg which were all normal  Symptoms of numbness in legs leading to inability to feel and therefore somet Tab, Take 10 mg by mouth nightly., Disp: , Rfl:   •  Cholecalciferol (VITAMIN D) 1000 UNITS Oral Tab, Take by mouth 2 (two) times daily. , Disp: , Rfl:   •  alprazolam 2 MG Oral Tab, Take 2 mg by mouth nightly as needed for Sleep., Disp: , Rfl:   •  Albuter

## 2019-12-04 RX ORDER — LEVETIRACETAM 500 MG/1
500 TABLET ORAL 2 TIMES DAILY
Qty: 180 TABLET | Refills: 1 | Status: SHIPPED | OUTPATIENT
Start: 2019-12-04 | End: 2020-06-22

## 2019-12-09 NOTE — PROCEDURES
[facility] with [address]  [phone]    Fax  [phone]    Electrophysiologic Consultation      Patient: Dl Lindquist

## 2020-02-28 ENCOUNTER — TELEPHONE (OUTPATIENT)
Dept: NEUROLOGY | Facility: CLINIC | Age: 60
End: 2020-02-28

## 2020-02-28 NOTE — TELEPHONE ENCOUNTER
S: patient is c/o about sleeping problem. B: Advised to discuss with Dr Baljeet Morales about Leflunomide.   Upon consultation with Dr Reynold Bolden, he recommended using Tecfidera with the Leflunomide     A: patient is having trouble sleeping, she does not remember w

## 2020-03-18 ENCOUNTER — TELEPHONE (OUTPATIENT)
Dept: NEUROLOGY | Facility: CLINIC | Age: 60
End: 2020-03-18

## 2020-03-18 DIAGNOSIS — G37.9 DEMYELINATING DISEASE (HCC): Primary | ICD-10-CM

## 2020-03-18 NOTE — TELEPHONE ENCOUNTER
Pt stated she needs new orders for her annual MRI's, not sure which she needs, she also uses sedation during MRI and wants to do the MRI's at [facility]

## 2020-03-20 ENCOUNTER — TELEPHONE (OUTPATIENT)
Dept: NEUROLOGY | Facility: CLINIC | Age: 60
End: 2020-03-20

## 2020-03-20 NOTE — TELEPHONE ENCOUNTER
Patient will call us back to let us know were she is having the MRI not sure if  [facility] or [facility].

## 2020-03-27 ENCOUNTER — TELEPHONE (OUTPATIENT)
Dept: NEUROLOGY | Facility: CLINIC | Age: 60
End: 2020-03-27

## 2020-04-21 ENCOUNTER — TELEPHONE (OUTPATIENT)
Dept: NEUROLOGY | Facility: CLINIC | Age: 60
End: 2020-04-21

## 2020-04-23 NOTE — TELEPHONE ENCOUNTER
Converted pt;s 4/28/20 OV to a phone visit. She declined the video option. Requested we mail her notebook she had previously left behind. Put notebook in mail for 4/24/20.

## 2020-04-28 ENCOUNTER — VIRTUAL PHONE E/M (OUTPATIENT)
Dept: NEUROLOGY | Facility: CLINIC | Age: 60
End: 2020-04-28
Payer: COMMERCIAL

## 2020-04-30 ENCOUNTER — VIRTUAL PHONE E/M (OUTPATIENT)
Dept: NEUROLOGY | Facility: CLINIC | Age: 60
End: 2020-04-30
Payer: COMMERCIAL

## 2020-04-30 DIAGNOSIS — M79.7 FIBROMYALGIA: ICD-10-CM

## 2020-04-30 DIAGNOSIS — M06.031 RHEUMATOID ARTHRITIS INVOLVING BOTH WRISTS WITH NEGATIVE RHEUMATOID FACTOR (HCC): Primary | ICD-10-CM

## 2020-04-30 DIAGNOSIS — M06.032 RHEUMATOID ARTHRITIS INVOLVING BOTH WRISTS WITH NEGATIVE RHEUMATOID FACTOR (HCC): Primary | ICD-10-CM

## 2020-04-30 DIAGNOSIS — G37.9 DEMYELINATING DISEASE OF CENTRAL NERVOUS SYSTEM (HCC): ICD-10-CM

## 2020-04-30 DIAGNOSIS — R90.89 ABNORMAL FINDING ON MRI OF BRAIN: ICD-10-CM

## 2020-04-30 DIAGNOSIS — R83.8 OLIGOCLONAL BANDS IN CEREBROSPINAL FLUID: ICD-10-CM

## 2020-04-30 PROCEDURE — 99213 OFFICE O/P EST LOW 20 MIN: CPT | Performed by: OTHER

## 2020-04-30 NOTE — PROGRESS NOTES
Virtual Telephone Check-In  Called and no answer twice    Nguyen Fong MD  Vascular & General Neurology  Director, Multiple Sclerosis Program  [facility]  4/30/2020, Time completed 10:02 AM

## 2020-04-30 NOTE — PROGRESS NOTES
Virtual Telephone Check-In    [facility] verbally consents to a Virtual/Telephone Check-In visit on 04/30/20.     Patient understands and accepts financial responsibility for any deductible, co-insurance and/or co-pays associated with this serv PO  QHS, Disp: , Rfl: 3  •  fluorometholone 0.1 % Ophthalmic Suspension, Place 1 drop into both eyes 3 (three) times daily. , Disp: 1 Bottle, Rfl: 0  •  BuPROPion HCl ER, SR, 150 MG Oral Tablet 12 Hr, TK 2 TS PO QAM AND TK 1 T PO QD AT 4PM, Disp: , Rfl: 1 around  Balance is truly very off.       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

## 2020-06-08 ENCOUNTER — HOSPITAL ENCOUNTER (OUTPATIENT)
Dept: MRI IMAGING | Facility: HOSPITAL | Age: 60
Discharge: HOME | End: 2020-06-08
Attending: Other
Payer: COMMERCIAL

## 2020-06-16 VITALS — BODY MASS INDEX: 28.14 KG/M2 | HEIGHT: 69 IN | WEIGHT: 190 LBS

## 2020-06-16 RX ORDER — SODIUM CHLORIDE, SODIUM LACTATE, POTASSIUM CHLORIDE, CALCIUM CHLORIDE 600; 310; 30; 20 MG/100ML; MG/100ML; MG/100ML; MG/100ML
INJECTION, SOLUTION INTRAVENOUS CONTINUOUS
Status: CANCELLED | OUTPATIENT
Start: 2020-06-16

## 2020-06-19 ENCOUNTER — TELEPHONE (OUTPATIENT)
Dept: CT IMAGING | Facility: HOSPITAL | Age: 60
End: 2020-06-19

## 2020-06-19 NOTE — TELEPHONE ENCOUNTER
Pt sees Dr. Kenji Burnett in [facility]. Wrong contact info was in epic and corrected by this RN. Pt sts saw hime 6/17 for H&P. LM w his office requesting fax of H&P. Phone and Fax # given. Pt has MRI w anesthesia on 6/23 and H&P needed.

## 2020-06-20 ENCOUNTER — LAB ENCOUNTER (OUTPATIENT)
Dept: LAB | Facility: HOSPITAL | Age: 60
End: 2020-06-20
Attending: Other
Payer: COMMERCIAL

## 2020-06-20 DIAGNOSIS — Z01.818 OTHER SPECIFIED PRE-OPERATIVE EXAMINATION: ICD-10-CM

## 2020-06-20 DIAGNOSIS — Z11.59 ENCOUNTER FOR SCREENING FOR OTHER VIRAL DISEASES: ICD-10-CM

## 2020-06-22 RX ORDER — LEVETIRACETAM 500 MG/1
TABLET ORAL
Qty: 180 TABLET | Refills: 3 | Status: SHIPPED | OUTPATIENT
Start: 2020-06-22 | End: 2021-06-18

## 2020-06-22 NOTE — TELEPHONE ENCOUNTER
Medication: levetiracetam    Date of last refill: 12/4/19  [place]    Last office visit: 4/30/20  Due back to clinic per last office note:  8 months  Date next office visit scheduled:    Future Appointments   Date Charles Francis

## 2020-06-23 ENCOUNTER — HOSPITAL ENCOUNTER (OUTPATIENT)
Dept: MRI IMAGING | Facility: HOSPITAL | Age: 60
Discharge: HOME | End: 2020-06-23
Attending: Other
Payer: COMMERCIAL

## 2020-07-17 ENCOUNTER — TELEPHONE (OUTPATIENT)
Dept: NEUROLOGY | Facility: CLINIC | Age: 60
End: 2020-07-17

## 2020-07-17 DIAGNOSIS — G37.9 DEMYELINATING DISEASE OF CENTRAL NERVOUS SYSTEM (HCC): Primary | ICD-10-CM

## 2020-07-17 NOTE — TELEPHONE ENCOUNTER
S: Pt had a seizure. B: Dx: MS and Rheumatoid Arthritis    A: Pt had a seizure 2 weeks ago in the bathroom and blacked out. Pt has fallen out of her bed multiple times. Pt concerned she is having seizures in her sleep.  Pt is in severe pain and looking f

## 2020-07-17 NOTE — TELEPHONE ENCOUNTER
Current Outpatient Medications:   •  LEVETIRACETAM 500 MG Oral Tab, TAKE 1 TABLET TWICE A DAY, Disp: 180 tablet, Rfl: 3  •  pregabalin 150 MG Oral Cap, Take 1 capsule (150 mg total) by mouth 3 (three) times daily. , Disp: 270 capsule, Rfl: 1  •  leflunomi relates that she has not been sleeping well and then had \"seizures\" and I told her the simplest remedy today is to increase the Keppra to 750 mg BID and advised to make an appointment so I can do a physical examination.   Also offered alternative to go to

## 2020-08-27 ENCOUNTER — OFFICE VISIT (OUTPATIENT)
Dept: NEUROLOGY | Facility: CLINIC | Age: 60
End: 2020-08-27
Payer: COMMERCIAL

## 2020-08-27 VITALS
DIASTOLIC BLOOD PRESSURE: 78 MMHG | RESPIRATION RATE: 16 BRPM | WEIGHT: 190 LBS | HEIGHT: 69 IN | SYSTOLIC BLOOD PRESSURE: 126 MMHG | TEMPERATURE: 98 F | BODY MASS INDEX: 28.14 KG/M2 | HEART RATE: 74 BPM

## 2020-08-27 DIAGNOSIS — G37.9 DEMYELINATING DISEASE OF CENTRAL NERVOUS SYSTEM (HCC): Primary | ICD-10-CM

## 2020-08-27 DIAGNOSIS — R56.9 SEIZURES (HCC): ICD-10-CM

## 2020-08-27 DIAGNOSIS — M06.032 RHEUMATOID ARTHRITIS INVOLVING BOTH WRISTS WITH NEGATIVE RHEUMATOID FACTOR (HCC): ICD-10-CM

## 2020-08-27 DIAGNOSIS — M06.031 RHEUMATOID ARTHRITIS INVOLVING BOTH WRISTS WITH NEGATIVE RHEUMATOID FACTOR (HCC): ICD-10-CM

## 2020-08-27 PROCEDURE — 3008F BODY MASS INDEX DOCD: CPT | Performed by: OTHER

## 2020-08-27 PROCEDURE — 99214 OFFICE O/P EST MOD 30 MIN: CPT | Performed by: OTHER

## 2020-08-27 PROCEDURE — 3078F DIAST BP <80 MM HG: CPT | Performed by: OTHER

## 2020-08-27 PROCEDURE — 3074F SYST BP LT 130 MM HG: CPT | Performed by: OTHER

## 2020-08-27 RX ORDER — HYDROCODONE BITARTRATE AND ACETAMINOPHEN 10; 325 MG/1; MG/1
1 TABLET ORAL EVERY 6 HOURS PRN
COMMUNITY
End: 2021-05-12

## 2020-08-27 NOTE — PROGRESS NOTES
[facility] with [address]  4/19/1960  Primary Care Provider:  No primary care provider on file.     8/27/2020  Accompanied visit:      () No.        62year old yo SR, 150 MG Oral Tablet 12 Hr, TK 2 TS PO QAM AND TK 1 T PO QD AT 4PM, Disp: , Rfl: 1  •  Halobetasol Propionate 0.05 % External Ointment, Apply to affected area daily, Disp: , Rfl: 3  •  VYVANSE 50 MG Oral Cap, Take 50 mg by mouth every morning.  , Disp: , assessment as long as she is getting it under IV sedation. We will also get her Keppra level and readjust the dose accordingly.     For as long as she is on leflunomide, this is similar to teriflunomide which should exert a disease modifying effect on he

## 2020-09-22 ENCOUNTER — APPOINTMENT (OUTPATIENT)
Dept: LAB | Age: 60
End: 2020-09-22
Attending: Other
Payer: COMMERCIAL

## 2020-09-22 DIAGNOSIS — Z01.818 OTHER SPECIFIED PRE-OPERATIVE EXAMINATION: ICD-10-CM

## 2020-09-22 DIAGNOSIS — Z11.59 SPECIAL SCREENING EXAMINATION FOR VIRAL DISEASE: ICD-10-CM

## 2020-09-22 NOTE — IMAGING NOTE
Called patient and left a voicemail notifying patient that she does not have history and physical with in 30 days for the MRI with anesthesia  scheduled  For 9/24/20.  Call back number provided for clarification

## 2020-09-23 ENCOUNTER — ANESTHESIA EVENT (OUTPATIENT)
Dept: MRI IMAGING | Facility: HOSPITAL | Age: 60
End: 2020-09-23
Payer: COMMERCIAL

## 2020-09-23 LAB — SARS-COV-2 RNA RESP QL NAA+PROBE: NOT DETECTED

## 2020-09-24 ENCOUNTER — TELEPHONE (OUTPATIENT)
Dept: NEUROLOGY | Facility: CLINIC | Age: 60
End: 2020-09-24

## 2020-09-24 ENCOUNTER — HOSPITAL ENCOUNTER (OUTPATIENT)
Dept: MRI IMAGING | Facility: HOSPITAL | Age: 60
Discharge: HOME | End: 2020-09-24
Attending: Other
Payer: COMMERCIAL

## 2020-09-24 ENCOUNTER — ANESTHESIA (OUTPATIENT)
Dept: MRI IMAGING | Facility: HOSPITAL | Age: 60
End: 2020-09-24
Payer: COMMERCIAL

## 2020-09-24 ENCOUNTER — NURSE ONLY (OUTPATIENT)
Dept: LAB | Facility: HOSPITAL | Age: 60
End: 2020-09-24
Attending: Other
Payer: COMMERCIAL

## 2020-09-24 VITALS
DIASTOLIC BLOOD PRESSURE: 84 MMHG | OXYGEN SATURATION: 94 % | SYSTOLIC BLOOD PRESSURE: 128 MMHG | HEART RATE: 94 BPM | TEMPERATURE: 98 F | RESPIRATION RATE: 18 BRPM

## 2020-09-24 VITALS
TEMPERATURE: 97 F | SYSTOLIC BLOOD PRESSURE: 124 MMHG | HEART RATE: 78 BPM | DIASTOLIC BLOOD PRESSURE: 75 MMHG | OXYGEN SATURATION: 95 % | RESPIRATION RATE: 16 BRPM

## 2020-09-24 DIAGNOSIS — M06.9 RHEUMATOID ARTHRITIS INVOLVING MULTIPLE JOINTS (HCC): ICD-10-CM

## 2020-09-24 DIAGNOSIS — G37.9 DEMYELINATING DISEASE OF CENTRAL NERVOUS SYSTEM (HCC): ICD-10-CM

## 2020-09-24 DIAGNOSIS — Z79.899 ENCOUNTER FOR LONG-TERM (CURRENT) USE OF HIGH-RISK MEDICATION: ICD-10-CM

## 2020-09-24 DIAGNOSIS — R56.9 SEIZURES (HCC): ICD-10-CM

## 2020-09-24 DIAGNOSIS — E55.9 VITAMIN D DEFICIENCY: ICD-10-CM

## 2020-09-24 LAB
ALBUMIN SERPL-MCNC: 3.8 G/DL (ref 3.4–5)
ALP LIVER SERPL-CCNC: 130 U/L
ALT SERPL-CCNC: 35 U/L
AST SERPL-CCNC: 22 U/L (ref 15–37)
BASOPHILS # BLD AUTO: 0.1 X10(3) UL (ref 0–0.2)
BASOPHILS NFR BLD AUTO: 1.7 %
BILIRUB DIRECT SERPL-MCNC: 0.1 MG/DL (ref 0–0.2)
BILIRUB SERPL-MCNC: 0.5 MG/DL (ref 0.1–2)
BUN BLD-MCNC: 14 MG/DL (ref 7–18)
CREAT BLD-MCNC: 1.1 MG/DL
CRP SERPL-MCNC: <0.29 MG/DL (ref ?–0.3)
DEPRECATED RDW RBC AUTO: 44.9 FL (ref 35.1–46.3)
EOSINOPHIL # BLD AUTO: 0.27 X10(3) UL (ref 0–0.7)
EOSINOPHIL NFR BLD AUTO: 4.5 %
ERYTHROCYTE [DISTWIDTH] IN BLOOD BY AUTOMATED COUNT: 13.6 % (ref 11–15)
HCT VFR BLD AUTO: 43 %
HGB BLD-MCNC: 14.2 G/DL
IMM GRANULOCYTES # BLD AUTO: 0.01 X10(3) UL (ref 0–1)
IMM GRANULOCYTES NFR BLD: 0.2 %
LYMPHOCYTES # BLD AUTO: 2.19 X10(3) UL (ref 1–4)
LYMPHOCYTES NFR BLD AUTO: 36.3 %
M PROTEIN MFR SERPL ELPH: 8 G/DL (ref 6.4–8.2)
MCH RBC QN AUTO: 29.6 PG (ref 26–34)
MCHC RBC AUTO-ENTMCNC: 33 G/DL (ref 31–37)
MCV RBC AUTO: 89.8 FL
MONOCYTES # BLD AUTO: 0.59 X10(3) UL (ref 0.1–1)
MONOCYTES NFR BLD AUTO: 9.8 %
NEUTROPHILS # BLD AUTO: 2.87 X10 (3) UL (ref 1.5–7.7)
NEUTROPHILS # BLD AUTO: 2.87 X10(3) UL (ref 1.5–7.7)
NEUTROPHILS NFR BLD AUTO: 47.5 %
PLATELET # BLD AUTO: 180 10(3)UL (ref 150–450)
RBC # BLD AUTO: 4.79 X10(6)UL
SED RATE-ML: 10 MM/HR
VIT D+METAB SERPL-MCNC: 22.7 NG/ML (ref 30–100)
WBC # BLD AUTO: 6 X10(3) UL (ref 4–11)

## 2020-09-24 PROCEDURE — 86140 C-REACTIVE PROTEIN: CPT

## 2020-09-24 PROCEDURE — 82306 VITAMIN D 25 HYDROXY: CPT

## 2020-09-24 PROCEDURE — 72156 MRI NECK SPINE W/O & W/DYE: CPT | Performed by: OTHER

## 2020-09-24 PROCEDURE — 70553 MRI BRAIN STEM W/O & W/DYE: CPT | Performed by: OTHER

## 2020-09-24 PROCEDURE — 80177 DRUG SCRN QUAN LEVETIRACETAM: CPT

## 2020-09-24 PROCEDURE — A9575 INJ GADOTERATE MEGLUMI 0.1ML: HCPCS | Performed by: OTHER

## 2020-09-24 PROCEDURE — 36415 COLL VENOUS BLD VENIPUNCTURE: CPT

## 2020-09-24 PROCEDURE — 80076 HEPATIC FUNCTION PANEL: CPT

## 2020-09-24 PROCEDURE — 82565 ASSAY OF CREATININE: CPT

## 2020-09-24 PROCEDURE — 85652 RBC SED RATE AUTOMATED: CPT

## 2020-09-24 PROCEDURE — 85025 COMPLETE CBC W/AUTO DIFF WBC: CPT

## 2020-09-24 PROCEDURE — 84520 ASSAY OF UREA NITROGEN: CPT

## 2020-09-24 RX ORDER — METOCLOPRAMIDE HYDROCHLORIDE 5 MG/ML
10 INJECTION INTRAMUSCULAR; INTRAVENOUS AS NEEDED
Status: ACTIVE | OUTPATIENT
Start: 2020-09-24 | End: 2020-09-24

## 2020-09-24 RX ORDER — ONDANSETRON 2 MG/ML
INJECTION INTRAMUSCULAR; INTRAVENOUS AS NEEDED
Status: DISCONTINUED | OUTPATIENT
Start: 2020-09-24 | End: 2020-09-24 | Stop reason: SURG

## 2020-09-24 RX ORDER — HYDROCODONE BITARTRATE AND ACETAMINOPHEN 5; 325 MG/1; MG/1
1 TABLET ORAL AS NEEDED
Status: DISCONTINUED | OUTPATIENT
Start: 2020-09-24 | End: 2020-09-26

## 2020-09-24 RX ORDER — NALOXONE HYDROCHLORIDE 0.4 MG/ML
80 INJECTION, SOLUTION INTRAMUSCULAR; INTRAVENOUS; SUBCUTANEOUS AS NEEDED
Status: ACTIVE | OUTPATIENT
Start: 2020-09-24 | End: 2020-09-24

## 2020-09-24 RX ORDER — MEPERIDINE HYDROCHLORIDE 25 MG/ML
12.5 INJECTION INTRAMUSCULAR; INTRAVENOUS; SUBCUTANEOUS AS NEEDED
Status: DISCONTINUED | OUTPATIENT
Start: 2020-09-24 | End: 2020-09-26

## 2020-09-24 RX ORDER — MIDAZOLAM HYDROCHLORIDE 1 MG/ML
INJECTION INTRAMUSCULAR; INTRAVENOUS AS NEEDED
Status: DISCONTINUED | OUTPATIENT
Start: 2020-09-24 | End: 2020-09-24 | Stop reason: SURG

## 2020-09-24 RX ORDER — LIDOCAINE HYDROCHLORIDE 10 MG/ML
INJECTION, SOLUTION EPIDURAL; INFILTRATION; INTRACAUDAL; PERINEURAL AS NEEDED
Status: DISCONTINUED | OUTPATIENT
Start: 2020-09-24 | End: 2020-09-24 | Stop reason: SURG

## 2020-09-24 RX ORDER — DEXAMETHASONE SODIUM PHOSPHATE 4 MG/ML
8 VIAL (ML) INJECTION AS NEEDED
Status: ACTIVE | OUTPATIENT
Start: 2020-09-24 | End: 2020-09-24

## 2020-09-24 RX ORDER — ONDANSETRON 2 MG/ML
4 INJECTION INTRAMUSCULAR; INTRAVENOUS AS NEEDED
Status: ACTIVE | OUTPATIENT
Start: 2020-09-24 | End: 2020-09-24

## 2020-09-24 RX ORDER — HYDROMORPHONE HYDROCHLORIDE 1 MG/ML
INJECTION, SOLUTION INTRAMUSCULAR; INTRAVENOUS; SUBCUTANEOUS
Status: COMPLETED
Start: 2020-09-24 | End: 2020-09-24

## 2020-09-24 RX ORDER — HYDROCODONE BITARTRATE AND ACETAMINOPHEN 5; 325 MG/1; MG/1
2 TABLET ORAL AS NEEDED
Status: DISCONTINUED | OUTPATIENT
Start: 2020-09-24 | End: 2020-09-26

## 2020-09-24 RX ORDER — DEXAMETHASONE SODIUM PHOSPHATE 4 MG/ML
VIAL (ML) INJECTION AS NEEDED
Status: DISCONTINUED | OUTPATIENT
Start: 2020-09-24 | End: 2020-09-24 | Stop reason: SURG

## 2020-09-24 RX ORDER — HYDROMORPHONE HYDROCHLORIDE 1 MG/ML
0.4 INJECTION, SOLUTION INTRAMUSCULAR; INTRAVENOUS; SUBCUTANEOUS EVERY 5 MIN PRN
Status: ACTIVE | OUTPATIENT
Start: 2020-09-24 | End: 2020-09-24

## 2020-09-24 RX ORDER — SODIUM CHLORIDE, SODIUM LACTATE, POTASSIUM CHLORIDE, CALCIUM CHLORIDE 600; 310; 30; 20 MG/100ML; MG/100ML; MG/100ML; MG/100ML
INJECTION, SOLUTION INTRAVENOUS CONTINUOUS
Status: DISCONTINUED | OUTPATIENT
Start: 2020-09-24 | End: 2020-09-26

## 2020-09-24 RX ORDER — GLYCOPYRROLATE 0.2 MG/ML
INJECTION, SOLUTION INTRAMUSCULAR; INTRAVENOUS AS NEEDED
Status: DISCONTINUED | OUTPATIENT
Start: 2020-09-24 | End: 2020-09-24 | Stop reason: SURG

## 2020-09-24 RX ORDER — NEOSTIGMINE METHYLSULFATE 1 MG/ML
INJECTION INTRAVENOUS AS NEEDED
Status: DISCONTINUED | OUTPATIENT
Start: 2020-09-24 | End: 2020-09-24 | Stop reason: SURG

## 2020-09-24 RX ORDER — MIDAZOLAM HYDROCHLORIDE 1 MG/ML
1 INJECTION INTRAMUSCULAR; INTRAVENOUS EVERY 5 MIN PRN
Status: ACTIVE | OUTPATIENT
Start: 2020-09-24 | End: 2020-09-24

## 2020-09-24 RX ORDER — ROCURONIUM BROMIDE 10 MG/ML
INJECTION, SOLUTION INTRAVENOUS AS NEEDED
Status: DISCONTINUED | OUTPATIENT
Start: 2020-09-24 | End: 2020-09-24 | Stop reason: SURG

## 2020-09-24 RX ADMIN — HYDROMORPHONE HYDROCHLORIDE 0.4 MG: 1 INJECTION, SOLUTION INTRAMUSCULAR; INTRAVENOUS; SUBCUTANEOUS at 16:55:00

## 2020-09-24 RX ADMIN — DEXAMETHASONE SODIUM PHOSPHATE 12 MG: 4 MG/ML VIAL (ML) INJECTION at 14:52:00

## 2020-09-24 RX ADMIN — ROCURONIUM BROMIDE 10 MG: 10 INJECTION, SOLUTION INTRAVENOUS at 14:02:00

## 2020-09-24 RX ADMIN — HYDROMORPHONE HYDROCHLORIDE 0.4 MG: 1 INJECTION, SOLUTION INTRAMUSCULAR; INTRAVENOUS; SUBCUTANEOUS at 17:00:00

## 2020-09-24 RX ADMIN — ROCURONIUM BROMIDE 30 MG: 10 INJECTION, SOLUTION INTRAVENOUS at 13:59:00

## 2020-09-24 RX ADMIN — MIDAZOLAM HYDROCHLORIDE 2 MG: 1 INJECTION INTRAMUSCULAR; INTRAVENOUS at 13:56:00

## 2020-09-24 RX ADMIN — MIDAZOLAM HYDROCHLORIDE 2 MG: 1 INJECTION INTRAMUSCULAR; INTRAVENOUS at 13:53:00

## 2020-09-24 RX ADMIN — NEOSTIGMINE METHYLSULFATE 4 MG: 1 INJECTION INTRAVENOUS at 15:20:00

## 2020-09-24 RX ADMIN — SODIUM CHLORIDE, SODIUM LACTATE, POTASSIUM CHLORIDE, CALCIUM CHLORIDE: 600; 310; 30; 20 INJECTION, SOLUTION INTRAVENOUS at 14:53:00

## 2020-09-24 RX ADMIN — SODIUM CHLORIDE, SODIUM LACTATE, POTASSIUM CHLORIDE, CALCIUM CHLORIDE: 600; 310; 30; 20 INJECTION, SOLUTION INTRAVENOUS at 13:52:00

## 2020-09-24 RX ADMIN — SODIUM CHLORIDE, SODIUM LACTATE, POTASSIUM CHLORIDE, CALCIUM CHLORIDE: 600; 310; 30; 20 INJECTION, SOLUTION INTRAVENOUS at 15:50:00

## 2020-09-24 RX ADMIN — LIDOCAINE HYDROCHLORIDE 50 MG: 10 INJECTION, SOLUTION EPIDURAL; INFILTRATION; INTRACAUDAL; PERINEURAL at 13:59:00

## 2020-09-24 RX ADMIN — ONDANSETRON 4 MG: 2 INJECTION INTRAMUSCULAR; INTRAVENOUS at 13:58:00

## 2020-09-24 RX ADMIN — GLYCOPYRROLATE 0.8 MG: 0.2 INJECTION, SOLUTION INTRAMUSCULAR; INTRAVENOUS at 15:20:00

## 2020-09-24 RX ADMIN — HYDROMORPHONE HYDROCHLORIDE 0.2 MG: 1 INJECTION, SOLUTION INTRAMUSCULAR; INTRAVENOUS; SUBCUTANEOUS at 17:05:00

## 2020-09-24 NOTE — TELEPHONE ENCOUNTER
Pt stated she thought she was suppose to get a MRI Spine Thoracic in addition to Spine cervical.    Call pt to verify.   She has the MRI scheduled today at 1:30pm but needs to leave by 12:30pm.  Pt leaving at 12:00pm    Call pt to verify asap

## 2020-09-24 NOTE — ANESTHESIA POSTPROCEDURE EVALUATION
[address] Patient Status:  Outpatient   Age/Gender 62year old female MRN [id number]   Location [facility] Attending Jenna Craft MD   Hosp Day # 0 PCP No primary care provider on file.        Anesthesia Post-

## 2020-09-24 NOTE — ANESTHESIA PROCEDURE NOTES
Airway  Urgency: elective    Airway not difficult    General Information and Staff    Patient location during procedure: OR  Anesthesiologist: Roxane Doyle MD  Performed: anesthesiologist     Indications and Patient Condition  Indications for airway manage

## 2020-09-24 NOTE — TELEPHONE ENCOUNTER
RN spoke to the patient and confirmed that there was no mention of the provider ordering a MRI of the Thoracic. Pt verbalized understanding and did not have any further questions.

## 2020-09-24 NOTE — ANESTHESIA PREPROCEDURE EVALUATION
PRE-OP EVALUATION    Patient Name: [place] Moffet    Pre-op Diagnosis: demyelinating disease    MRI brain    * Surgery not found *    Pre-op vitals reviewed.   Temp: 96.7 °F (35.9 °C)  Pulse: 78  Resp: 16  BP: 124/75  SpO2: 95 %  There is no height Impingement syndrome of right shoulder     Right shoulder pain     Tear of left rotator cuff, unspecified tear extent     Closed fracture of maxilla with routine healing, subsequent encounter     Seizure syndrome (HCC)     Shoulder impingement syndrome, ri Airway      Mallampati: II  Mouth opening: >3 FB  TM distance: > 6 cm  Neck ROM: limited Cardiovascular      Rhythm: regular  Rate: normal  (-) murmur   Dental  Comment: Dentition is grossly intact;   Patient does not demonstrate loose teeth to inspec

## 2020-09-25 NOTE — PROGRESS NOTES
SkyWire message sent to patient. Radha Wetzel labs are stable. Vit d is low; please start vit d prescription: 1 pill weekly for 12 weeks then repeat labs at that time. Continue current treatment plan. Repeat labs every 3 months.   Follow up as

## 2020-09-26 LAB — LEVETIRACETAM (KEPPRA): 2 UG/ML

## 2020-09-30 ENCOUNTER — TELEPHONE (OUTPATIENT)
Dept: NEUROLOGY | Facility: CLINIC | Age: 60
End: 2020-09-30

## 2020-09-30 RX ORDER — LEVETIRACETAM 750 MG/1
750 TABLET ORAL 2 TIMES DAILY
Qty: 180 TABLET | Refills: 2 | Status: SHIPPED | OUTPATIENT
Start: 2020-09-30 | End: 2021-10-17

## 2020-09-30 NOTE — TELEPHONE ENCOUNTER
Tried calling and left message to increase dose of Keppra to 750 mg twice a day.  Please try to reach out to her

## 2020-09-30 NOTE — TELEPHONE ENCOUNTER
Keppra increased to 750 mg per My Chart message. Pt requesting 90-day rx be sent to [facility] mail order pharmacy.

## 2020-09-30 NOTE — TELEPHONE ENCOUNTER
RN spoke to patient and informed her of the above information. Pt wanted to know how often she needs to have her blood drawn.   RN spoke to Saskia Ingram and she said she does not have to have her levels drawn again unless she has any problems and then to call the

## 2020-10-27 ENCOUNTER — TELEPHONE (OUTPATIENT)
Dept: NEUROLOGY | Facility: CLINIC | Age: 60
End: 2020-10-27

## 2020-12-03 ENCOUNTER — TELEMEDICINE (OUTPATIENT)
Dept: NEUROLOGY | Facility: CLINIC | Age: 60
End: 2020-12-03
Payer: COMMERCIAL

## 2020-12-03 DIAGNOSIS — M06.031 RHEUMATOID ARTHRITIS INVOLVING BOTH WRISTS WITH NEGATIVE RHEUMATOID FACTOR (HCC): ICD-10-CM

## 2020-12-03 DIAGNOSIS — G37.9 DEMYELINATING DISEASE OF CENTRAL NERVOUS SYSTEM (HCC): Primary | ICD-10-CM

## 2020-12-03 DIAGNOSIS — M06.032 RHEUMATOID ARTHRITIS INVOLVING BOTH WRISTS WITH NEGATIVE RHEUMATOID FACTOR (HCC): ICD-10-CM

## 2020-12-03 DIAGNOSIS — R56.9 SEIZURES (HCC): ICD-10-CM

## 2020-12-03 PROCEDURE — 99213 OFFICE O/P EST LOW 20 MIN: CPT | Performed by: OTHER

## 2020-12-03 RX ORDER — LEVETIRACETAM 1000 MG/1
1000 TABLET ORAL 2 TIMES DAILY
Qty: 60 TABLET | Refills: 5 | Status: SHIPPED | OUTPATIENT
Start: 2020-12-03 | End: 2021-10-17

## 2020-12-03 NOTE — PROGRESS NOTES
[id number] [place] [address] with [facility]  12/3/2020    Time note started 1:46 PM    Telephone Encounter after patient consenting to process and charges.   ( x ) WITH VIDEO      (  ) without video    62year old femal nightly as needed for Sleep., Disp: , Rfl:   •  Albuterol Sulfate  (90 BASE) MCG/ACT Inhalation Aero Soln, Inhale 2 puffs into the lungs every 6 (six) hours as needed for Wheezing., Disp: 6.7 g, Rfl: 1     CURRENT SITUATION:  She had another spell 3

## 2021-04-10 DIAGNOSIS — Z23 NEED FOR VACCINATION: ICD-10-CM

## 2021-05-12 ENCOUNTER — TELEPHONE (OUTPATIENT)
Dept: NEUROLOGY | Facility: CLINIC | Age: 61
End: 2021-05-12

## 2021-05-12 NOTE — TELEPHONE ENCOUNTER
S Patient called, is falling at home. B States her lower body does not keep up with upper body. Is also having global pain that is not controlled. Updated medication list; patient has discontinued many of her medications. Denies seizures.   Reports in

## 2021-05-12 NOTE — TELEPHONE ENCOUNTER
LM that she should be seen before we can make treatment decision  Has RA and MRI abnormalities which could be demyelinating but no cord lesion  She is taking Leflunomide for RA (similar the teriflunomide - Aubagio  Can propose solumedrol trial     Dr Jorden Booker

## 2021-05-12 NOTE — TELEPHONE ENCOUNTER
S: Calling with possible symptoms of MS relapse, starting weeks ago after she hurt her back. B: MS patient, currently using ms for DMT.   Last seen 8/27/2020 with notes: Problem/s Identified this visit:   Demyelinating disease of central nervous system (HC

## 2021-05-14 ENCOUNTER — PATIENT MESSAGE (OUTPATIENT)
Dept: NEUROLOGY | Facility: CLINIC | Age: 61
End: 2021-05-14

## 2021-05-17 NOTE — TELEPHONE ENCOUNTER
Responded via [email or web address] that Dr UREÑA is out of office this week and referred her to PCP or Urgent Care/ED for follow up regarding fall.

## 2021-05-17 NOTE — TELEPHONE ENCOUNTER
From: Kaya Coyle  To: Ac Hutchins MD  Sent: 5/14/2021 6:20 PM CDT  Subject: Other    Hi   I know I missed your calls i Still very much like to speak to you released and see you soon as possible.    This week was very hard is very hard fo

## 2021-05-17 NOTE — TELEPHONE ENCOUNTER
Patient is returning call. Advised that Dr. Magda Erickson is out this week. PT requesting a call from Javad Goode sometime today if possible to discuss. Patient is tentatively scheduled for an appointment with Dr. Magda Erickson on 6/18.  She believes that Dr. Ramirez So

## 2021-05-19 NOTE — TELEPHONE ENCOUNTER
Called patient for status update;  Patient saw internist 2 days ago, gave no new information from that visit. Patient  states her lower body is not keeping up with her upper. Has large bump on head that was check in hospital with no imaging.   Currently ha

## 2021-05-19 NOTE — TELEPHONE ENCOUNTER
Left voice-mail message for patient to call back and in voice-mail recommended she make an in person visit if she is comfortable.

## 2021-06-10 ENCOUNTER — TELEPHONE (OUTPATIENT)
Dept: NEUROLOGY | Facility: CLINIC | Age: 61
End: 2021-06-10

## 2021-06-10 NOTE — TELEPHONE ENCOUNTER
Patient called, requesting advice or recommendations from Dr. Manpreet Talamantes. Patient states she has a rare form of psoriasis that generally only happens to patients with neurological symptoms.  She advises that she has been experiencing big blisters on her

## 2021-06-15 ENCOUNTER — TELEPHONE (OUTPATIENT)
Dept: NEUROLOGY | Facility: CLINIC | Age: 61
End: 2021-06-15

## 2021-06-15 NOTE — TELEPHONE ENCOUNTER
[facility]  DOS: 06/10/21  Levetiracetam lab results received  Placed in nurses bin for review  Copy to scanning

## 2021-06-15 NOTE — TELEPHONE ENCOUNTER
Test results for Keppra level from [address] [place] [address] dated 6/9/21. Entered in to Epic as External Result. Results reviewed by RN. Keppra level is low at 1.1 (ref range 10-40). Routing to provider for recommendations.

## 2021-06-18 ENCOUNTER — OFFICE VISIT (OUTPATIENT)
Dept: NEUROLOGY | Facility: CLINIC | Age: 61
End: 2021-06-18
Payer: COMMERCIAL

## 2021-06-18 VITALS
SYSTOLIC BLOOD PRESSURE: 98 MMHG | BODY MASS INDEX: 29.62 KG/M2 | HEIGHT: 69 IN | DIASTOLIC BLOOD PRESSURE: 58 MMHG | OXYGEN SATURATION: 97 % | HEART RATE: 94 BPM | WEIGHT: 200 LBS

## 2021-06-18 DIAGNOSIS — G37.9 DEMYELINATING DISEASE OF CENTRAL NERVOUS SYSTEM (HCC): Primary | ICD-10-CM

## 2021-06-18 DIAGNOSIS — M06.031 RHEUMATOID ARTHRITIS INVOLVING BOTH WRISTS WITH NEGATIVE RHEUMATOID FACTOR (HCC): ICD-10-CM

## 2021-06-18 DIAGNOSIS — M06.032 RHEUMATOID ARTHRITIS INVOLVING BOTH WRISTS WITH NEGATIVE RHEUMATOID FACTOR (HCC): ICD-10-CM

## 2021-06-18 DIAGNOSIS — R56.9 SEIZURES (HCC): ICD-10-CM

## 2021-06-18 PROCEDURE — 99214 OFFICE O/P EST MOD 30 MIN: CPT | Performed by: OTHER

## 2021-06-18 PROCEDURE — 3074F SYST BP LT 130 MM HG: CPT | Performed by: OTHER

## 2021-06-18 PROCEDURE — 3008F BODY MASS INDEX DOCD: CPT | Performed by: OTHER

## 2021-06-18 PROCEDURE — 3078F DIAST BP <80 MM HG: CPT | Performed by: OTHER

## 2021-06-18 RX ORDER — SUMATRIPTAN 100 MG/1
TABLET, FILM COATED ORAL
COMMUNITY
Start: 2021-06-17

## 2021-06-18 RX ORDER — METOPROLOL SUCCINATE 25 MG/1
25 TABLET, EXTENDED RELEASE ORAL DAILY
COMMUNITY
Start: 2021-06-14

## 2021-06-18 RX ORDER — DIAZEPAM 10 MG/1
10 TABLET ORAL 2 TIMES DAILY PRN
COMMUNITY
Start: 2021-05-17

## 2021-06-18 RX ORDER — LEVETIRACETAM 750 MG/1
TABLET, EXTENDED RELEASE ORAL
Qty: 60 TABLET | Refills: 5 | Status: SHIPPED | OUTPATIENT
Start: 2021-06-18 | End: 2021-12-28

## 2021-06-18 NOTE — PROGRESS NOTES
[facility] with [address]  4/19/1960  Primary Care Provider:  Angelica Madrid DO    6/18/2021  Accompanied visit:      () No.      62year old yo patient being se dose 160 mg week 0, then 80mg at weeks 2,4,6,8,10 and 12 then Maintenance dose 80 mg every 4 weeks, Disp: 8 each, Rfl: 0  •  levetiracetam (KEPPRA) 1000 MG Oral Tab, Take 1 tablet (1,000 mg total) by mouth 2 (two) times daily. , Disp: 60 tablet, Rfl: 5  • [facility])      Discussion plus Diagnostics & Treatment Orders:  Orders Placed This Encounter      Levetiracetam, S          Standing Status: Future          Standing Expiration Date: 6/18/2022          Order Specific Question: Release to patient          Answe the stated date of service based on the information available to me at this time and may supersedes any prior opinion expressed either orally or in writing.   Services rendered are only within the scope of direct medical care

## 2021-12-28 RX ORDER — LEVETIRACETAM 750 MG/1
TABLET, EXTENDED RELEASE ORAL
Qty: 60 TABLET | Refills: 5 | Status: SHIPPED | OUTPATIENT
Start: 2021-12-28

## 2021-12-28 NOTE — TELEPHONE ENCOUNTER
Medication: LEVETIRACETAM  MG      Date of last refill: 6/18/21 (#60/5R)  [place]     Last office visit: 6/18/21  Due back to clinic per last office note:  6 mon  Date next office visit scheduled:    Future Joaquina

## 2022-03-21 ENCOUNTER — TELEPHONE (OUTPATIENT)
Dept: NEUROLOGY | Facility: CLINIC | Age: 62
End: 2022-03-21

## 2022-05-31 DIAGNOSIS — R56.9 SEIZURES (HCC): Primary | ICD-10-CM

## 2022-05-31 RX ORDER — LEVETIRACETAM 750 MG/1
TABLET, EXTENDED RELEASE ORAL
Qty: 180 TABLET | Refills: 1 | Status: SHIPPED | OUTPATIENT
Start: 2022-05-31

## 2022-09-07 ENCOUNTER — OFFICE VISIT (OUTPATIENT)
Dept: NEUROLOGY | Facility: CLINIC | Age: 62
End: 2022-09-07
Payer: COMMERCIAL

## 2022-09-07 VITALS
WEIGHT: 200 LBS | RESPIRATION RATE: 16 BRPM | HEIGHT: 69 IN | DIASTOLIC BLOOD PRESSURE: 84 MMHG | SYSTOLIC BLOOD PRESSURE: 140 MMHG | HEART RATE: 74 BPM | BODY MASS INDEX: 29.62 KG/M2

## 2022-09-07 DIAGNOSIS — F90.8 ATTENTION DEFICIT HYPERACTIVITY DISORDER (ADHD), OTHER TYPE: ICD-10-CM

## 2022-09-07 DIAGNOSIS — G89.4 CHRONIC PAIN SYNDROME: ICD-10-CM

## 2022-09-07 DIAGNOSIS — G37.9 DEMYELINATING DISEASE OF CENTRAL NERVOUS SYSTEM (HCC): Primary | ICD-10-CM

## 2022-09-07 PROCEDURE — 3079F DIAST BP 80-89 MM HG: CPT | Performed by: OTHER

## 2022-09-07 PROCEDURE — 99214 OFFICE O/P EST MOD 30 MIN: CPT | Performed by: OTHER

## 2022-09-07 PROCEDURE — 3077F SYST BP >= 140 MM HG: CPT | Performed by: OTHER

## 2022-09-07 PROCEDURE — 3008F BODY MASS INDEX DOCD: CPT | Performed by: OTHER

## 2022-09-07 RX ORDER — DEXTROAMPHETAMINE SACCHARATE, AMPHETAMINE ASPARTATE, DEXTROAMPHETAMINE SULFATE AND AMPHETAMINE SULFATE 5; 5; 5; 5 MG/1; MG/1; MG/1; MG/1
20 TABLET ORAL DAILY
Qty: 30 TABLET | Refills: 0 | Status: SHIPPED | OUTPATIENT
Start: 2022-09-07

## 2022-09-07 RX ORDER — LEVETIRACETAM 750 MG/1
750 TABLET, EXTENDED RELEASE ORAL 2 TIMES DAILY
COMMUNITY

## 2022-09-29 NOTE — TELEPHONE ENCOUNTER
Pt called again for adderall refill and stated many pharmacies are out of it at this time. Pt hoping to find a pharmacy that supplies adderall. Call pt to discuss other adderall options.

## 2022-10-03 ENCOUNTER — TELEPHONE (OUTPATIENT)
Dept: NEUROLOGY | Facility: CLINIC | Age: 62
End: 2022-10-03

## 2022-11-09 ENCOUNTER — OFFICE VISIT (OUTPATIENT)
Dept: NEUROLOGY | Facility: CLINIC | Age: 62
End: 2022-11-09
Payer: COMMERCIAL

## 2022-11-09 VITALS
HEIGHT: 69 IN | RESPIRATION RATE: 16 BRPM | SYSTOLIC BLOOD PRESSURE: 136 MMHG | DIASTOLIC BLOOD PRESSURE: 78 MMHG | HEART RATE: 82 BPM | WEIGHT: 200 LBS | BODY MASS INDEX: 29.62 KG/M2

## 2022-11-09 DIAGNOSIS — R56.9 SEIZURES (HCC): ICD-10-CM

## 2022-11-09 DIAGNOSIS — M06.031 RHEUMATOID ARTHRITIS INVOLVING BOTH WRISTS WITH NEGATIVE RHEUMATOID FACTOR (HCC): ICD-10-CM

## 2022-11-09 DIAGNOSIS — G37.9 DEMYELINATING DISEASE OF CENTRAL NERVOUS SYSTEM (HCC): Primary | ICD-10-CM

## 2022-11-09 DIAGNOSIS — M06.032 RHEUMATOID ARTHRITIS INVOLVING BOTH WRISTS WITH NEGATIVE RHEUMATOID FACTOR (HCC): ICD-10-CM

## 2022-11-09 DIAGNOSIS — F90.8 ATTENTION DEFICIT HYPERACTIVITY DISORDER (ADHD), OTHER TYPE: ICD-10-CM

## 2022-11-09 PROCEDURE — 3078F DIAST BP <80 MM HG: CPT | Performed by: OTHER

## 2022-11-09 PROCEDURE — 3075F SYST BP GE 130 - 139MM HG: CPT | Performed by: OTHER

## 2022-11-09 PROCEDURE — 3008F BODY MASS INDEX DOCD: CPT | Performed by: OTHER

## 2022-11-09 PROCEDURE — 99214 OFFICE O/P EST MOD 30 MIN: CPT | Performed by: OTHER

## 2022-11-09 RX ORDER — DEXTROAMPHETAMINE SACCHARATE, AMPHETAMINE ASPARTATE, DEXTROAMPHETAMINE SULFATE AND AMPHETAMINE SULFATE 2.5; 2.5; 2.5; 2.5 MG/1; MG/1; MG/1; MG/1
TABLET ORAL
Qty: 60 TABLET | Refills: 0 | Status: SHIPPED | OUTPATIENT
Start: 2022-11-09 | End: 2022-11-09 | Stop reason: ALTCHOICE

## 2022-12-15 ENCOUNTER — TELEPHONE (OUTPATIENT)
Dept: NEUROLOGY | Facility: CLINIC | Age: 62
End: 2022-12-15

## 2022-12-15 DIAGNOSIS — F90.8 ATTENTION DEFICIT HYPERACTIVITY DISORDER (ADHD), OTHER TYPE: ICD-10-CM

## 2022-12-15 RX ORDER — DEXTROAMPHETAMINE SACCHARATE, AMPHETAMINE ASPARTATE, DEXTROAMPHETAMINE SULFATE AND AMPHETAMINE SULFATE 5; 5; 5; 5 MG/1; MG/1; MG/1; MG/1
TABLET ORAL
Qty: 30 TABLET | Refills: 0 | Status: SHIPPED | OUTPATIENT
Start: 2022-12-15

## 2022-12-19 NOTE — TELEPHONE ENCOUNTER
No response on previous request sent per TE. PA resubmitted to insurance, marked urgent.   Holding for response

## 2022-12-19 NOTE — TELEPHONE ENCOUNTER
Received fax from [employer]   Approval received for patient use of Adderall 20mg   Approval granted: 12/19/22-12/18/23        Pt notified

## 2022-12-27 NOTE — TELEPHONE ENCOUNTER
LM on pt preferred number requesting clarification on current Lamotrigine dose.     Holding for response

## 2023-01-03 RX ORDER — LEVETIRACETAM 750 MG/1
TABLET, EXTENDED RELEASE ORAL
Qty: 60 TABLET | Refills: 2 | Status: SHIPPED | OUTPATIENT
Start: 2023-01-03

## 2023-01-24 DIAGNOSIS — F90.8 ATTENTION DEFICIT HYPERACTIVITY DISORDER (ADHD), OTHER TYPE: ICD-10-CM

## 2023-01-24 RX ORDER — DEXTROAMPHETAMINE SACCHARATE, AMPHETAMINE ASPARTATE, DEXTROAMPHETAMINE SULFATE AND AMPHETAMINE SULFATE 5; 5; 5; 5 MG/1; MG/1; MG/1; MG/1
TABLET ORAL
Qty: 30 TABLET | Refills: 0 | Status: SHIPPED | OUTPATIENT
Start: 2023-01-24

## 2023-02-27 DIAGNOSIS — F90.8 ATTENTION DEFICIT HYPERACTIVITY DISORDER (ADHD), OTHER TYPE: ICD-10-CM

## 2023-02-27 RX ORDER — DEXTROAMPHETAMINE SACCHARATE, AMPHETAMINE ASPARTATE, DEXTROAMPHETAMINE SULFATE AND AMPHETAMINE SULFATE 5; 5; 5; 5 MG/1; MG/1; MG/1; MG/1
TABLET ORAL
Qty: 30 TABLET | Refills: 0 | Status: SHIPPED | OUTPATIENT
Start: 2023-02-27

## 2023-03-24 DIAGNOSIS — F90.8 ATTENTION DEFICIT HYPERACTIVITY DISORDER (ADHD), OTHER TYPE: ICD-10-CM

## 2023-03-24 RX ORDER — DEXTROAMPHETAMINE SACCHARATE, AMPHETAMINE ASPARTATE, DEXTROAMPHETAMINE SULFATE AND AMPHETAMINE SULFATE 5; 5; 5; 5 MG/1; MG/1; MG/1; MG/1
TABLET ORAL
Qty: 30 TABLET | Refills: 0 | Status: SHIPPED | OUTPATIENT
Start: 2023-03-28

## 2023-03-24 NOTE — TELEPHONE ENCOUNTER
Medication: amphetamine-dextroamphetamine 20 MG Oral Tab     Date of last refill: 2/27/2023 (#30/0)  [place] 4/13/3355     Last office visit: 11/9/2022  Due back to clinic per last office note:  5/9/2023  Date next office visit scheduled:    Future Appointments   Date Time Provider Ivis Hdez   5/3/2023  2:20 PM MD BHUMIKA Valadez [place] [address] [address] note recommendation:

## 2023-03-24 NOTE — TELEPHONE ENCOUNTER
Patient calling, requesting a refill on:    amphetamine-dextroamphetamine 20 MG Oral Tab    Please advise and send to:    Dav  [address] [facility], [address] [place] [address] AT [address] [place] [address], [phone], [phone]    Patient notes she will be out of medication on Sunday.

## 2023-03-28 DIAGNOSIS — F90.8 ATTENTION DEFICIT HYPERACTIVITY DISORDER (ADHD), OTHER TYPE: ICD-10-CM

## 2023-03-28 RX ORDER — DEXTROAMPHETAMINE SACCHARATE, AMPHETAMINE ASPARTATE, DEXTROAMPHETAMINE SULFATE AND AMPHETAMINE SULFATE 5; 5; 5; 5 MG/1; MG/1; MG/1; MG/1
TABLET ORAL
Qty: 30 TABLET | Refills: 0 | Status: SHIPPED | OUTPATIENT
Start: 2023-03-28

## 2023-05-03 ENCOUNTER — TELEMEDICINE (OUTPATIENT)
Dept: NEUROLOGY | Facility: CLINIC | Age: 63
End: 2023-05-03
Payer: COMMERCIAL

## 2023-05-03 DIAGNOSIS — M06.032 RHEUMATOID ARTHRITIS INVOLVING BOTH WRISTS WITH NEGATIVE RHEUMATOID FACTOR (HCC): ICD-10-CM

## 2023-05-03 DIAGNOSIS — R90.89 ABNORMAL FINDING ON MRI OF BRAIN: ICD-10-CM

## 2023-05-03 DIAGNOSIS — M06.031 RHEUMATOID ARTHRITIS INVOLVING BOTH WRISTS WITH NEGATIVE RHEUMATOID FACTOR (HCC): ICD-10-CM

## 2023-05-03 DIAGNOSIS — R20.8 BURNING SENSATION: Primary | ICD-10-CM

## 2023-05-03 PROCEDURE — 99213 OFFICE O/P EST LOW 20 MIN: CPT | Performed by: OTHER

## 2023-05-15 NOTE — TELEPHONE ENCOUNTER
PA requested for Adderall  Clinical questions answered and submitted to insurance  Awaiting coverage determination
Rx picked up 3/28/23  Closing encounter
Alert-The patient is alert, awake and responds to voice. The patient is oriented to time, place, and person. The triage nurse is able to obtain subjective information.

## 2023-05-16 ENCOUNTER — TELEPHONE (OUTPATIENT)
Dept: NEUROLOGY | Facility: CLINIC | Age: 63
End: 2023-05-16

## 2023-05-16 DIAGNOSIS — R56.9 SEIZURES (HCC): Primary | ICD-10-CM

## 2023-05-16 NOTE — TELEPHONE ENCOUNTER
RN called the patient and confirmed she needs a Keppra Lab ordered. RN ordered the lab and informed the patient the order is in the system and she can go and get the labs completed. Pt verbalized understanding and did not have any further questions.

## 2023-05-17 DIAGNOSIS — F90.8 ATTENTION DEFICIT HYPERACTIVITY DISORDER (ADHD), OTHER TYPE: ICD-10-CM

## 2023-05-17 RX ORDER — DEXTROAMPHETAMINE SACCHARATE, AMPHETAMINE ASPARTATE, DEXTROAMPHETAMINE SULFATE AND AMPHETAMINE SULFATE 5; 5; 5; 5 MG/1; MG/1; MG/1; MG/1
TABLET ORAL
Qty: 30 TABLET | Refills: 0 | Status: SHIPPED | OUTPATIENT
Start: 2023-05-17

## 2023-06-06 PROBLEM — F90.0 ATTENTION DEFICIT HYPERACTIVITY DISORDER (ADHD), PREDOMINANTLY INATTENTIVE TYPE: Status: ACTIVE | Noted: 2019-08-21
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
## 2023-06-06 PROBLEM — S01.81XA LACERATION OF FOREHEAD: Status: ACTIVE | Noted: 2018-12-05

## 2023-06-06 PROBLEM — J45.20 WELL CONTROLLED INTERMITTENT ASTHMA: Status: ACTIVE | Noted: 2023-06-06

## 2023-06-06 PROBLEM — F45.0 SOMATIZATION DISORDER: Status: ACTIVE | Noted: 2017-08-25

## 2023-06-06 PROBLEM — J45.20: Status: ACTIVE | Noted: 2023-06-06

## 2023-06-06 PROBLEM — S20.212A CHEST WALL CONTUSION, LEFT, INITIAL ENCOUNTER: Status: ACTIVE | Noted: 2018-12-05

## 2023-06-06 RX ORDER — PREGABALIN 150 MG/1
150 CAPSULE ORAL 3 TIMES DAILY
COMMUNITY
Start: 2023-05-14

## 2023-06-06 RX ORDER — RISANKIZUMAB-RZAA 150 MG/ML
150 INJECTION SUBCUTANEOUS
COMMUNITY
Start: 2023-04-12 | End: 2023-06-07

## 2023-06-06 RX ORDER — PREDNISONE 5 MG/1
TABLET ORAL
COMMUNITY
Start: 2023-05-23

## 2023-06-07 RX ORDER — TRAMADOL HYDROCHLORIDE 50 MG/1
50 TABLET ORAL EVERY 6 HOURS PRN
COMMUNITY

## 2023-06-07 RX ORDER — SODIUM CHLORIDE 9 MG/ML
INJECTION, SOLUTION INTRAVENOUS CONTINUOUS
OUTPATIENT
Start: 2023-06-07

## 2023-06-15 DIAGNOSIS — F90.8 ATTENTION DEFICIT HYPERACTIVITY DISORDER (ADHD), OTHER TYPE: ICD-10-CM

## 2023-06-15 RX ORDER — DEXTROAMPHETAMINE SACCHARATE, AMPHETAMINE ASPARTATE, DEXTROAMPHETAMINE SULFATE AND AMPHETAMINE SULFATE 5; 5; 5; 5 MG/1; MG/1; MG/1; MG/1
TABLET ORAL
Qty: 30 TABLET | Refills: 0 | Status: SHIPPED | OUTPATIENT
Start: 2023-06-15

## 2023-06-16 ENCOUNTER — ANESTHESIA (OUTPATIENT)
Dept: MRI IMAGING | Facility: HOSPITAL | Age: 63
End: 2023-06-16
Payer: COMMERCIAL

## 2023-06-16 ENCOUNTER — ANESTHESIA EVENT (OUTPATIENT)
Dept: MRI IMAGING | Facility: HOSPITAL | Age: 63
End: 2023-06-16
Payer: COMMERCIAL

## 2023-06-16 ENCOUNTER — HOSPITAL ENCOUNTER (OUTPATIENT)
Dept: MRI IMAGING | Facility: HOSPITAL | Age: 63
Discharge: HOME | End: 2023-06-16
Attending: Other
Payer: COMMERCIAL

## 2023-06-16 ENCOUNTER — LAB ENCOUNTER (OUTPATIENT)
Dept: LAB | Facility: HOSPITAL | Age: 63
End: 2023-06-16
Attending: Other
Payer: COMMERCIAL

## 2023-06-16 VITALS
OXYGEN SATURATION: 100 % | DIASTOLIC BLOOD PRESSURE: 69 MMHG | WEIGHT: 200 LBS | BODY MASS INDEX: 29.62 KG/M2 | HEART RATE: 72 BPM | RESPIRATION RATE: 16 BRPM | HEIGHT: 69 IN | SYSTOLIC BLOOD PRESSURE: 121 MMHG | TEMPERATURE: 98 F

## 2023-06-16 DIAGNOSIS — R20.8 BURNING SENSATION: ICD-10-CM

## 2023-06-16 DIAGNOSIS — R56.9 SEIZURES (HCC): ICD-10-CM

## 2023-06-16 DIAGNOSIS — R90.89 ABNORMAL FINDING ON MRI OF BRAIN: ICD-10-CM

## 2023-06-16 PROCEDURE — A9575 INJ GADOTERATE MEGLUMI 0.1ML: HCPCS | Performed by: OTHER

## 2023-06-16 PROCEDURE — 36415 COLL VENOUS BLD VENIPUNCTURE: CPT

## 2023-06-16 PROCEDURE — 70553 MRI BRAIN STEM W/O & W/DYE: CPT | Performed by: OTHER

## 2023-06-16 PROCEDURE — 80177 DRUG SCRN QUAN LEVETIRACETAM: CPT

## 2023-06-16 RX ORDER — SODIUM CHLORIDE, SODIUM LACTATE, POTASSIUM CHLORIDE, CALCIUM CHLORIDE 600; 310; 30; 20 MG/100ML; MG/100ML; MG/100ML; MG/100ML
INJECTION, SOLUTION INTRAVENOUS CONTINUOUS PRN
Status: DISCONTINUED | OUTPATIENT
Start: 2023-06-16 | End: 2023-06-16 | Stop reason: SURG

## 2023-06-16 RX ORDER — SODIUM CHLORIDE, SODIUM LACTATE, POTASSIUM CHLORIDE, CALCIUM CHLORIDE 600; 310; 30; 20 MG/100ML; MG/100ML; MG/100ML; MG/100ML
INJECTION, SOLUTION INTRAVENOUS CONTINUOUS
Status: DISCONTINUED | OUTPATIENT
Start: 2023-06-16 | End: 2023-06-18

## 2023-06-16 RX ORDER — ACETAMINOPHEN 500 MG
1000 TABLET ORAL ONCE AS NEEDED
Status: ACTIVE | OUTPATIENT
Start: 2023-06-16 | End: 2023-06-16

## 2023-06-16 RX ORDER — GADOTERATE MEGLUMINE 376.9 MG/ML
20 INJECTION INTRAVENOUS
Status: COMPLETED | OUTPATIENT
Start: 2023-06-16 | End: 2023-06-16

## 2023-06-16 RX ORDER — HYDROMORPHONE HYDROCHLORIDE 1 MG/ML
0.6 INJECTION, SOLUTION INTRAMUSCULAR; INTRAVENOUS; SUBCUTANEOUS EVERY 5 MIN PRN
Status: ACTIVE | OUTPATIENT
Start: 2023-06-16 | End: 2023-06-16

## 2023-06-16 RX ORDER — PROCHLORPERAZINE EDISYLATE 5 MG/ML
5 INJECTION INTRAMUSCULAR; INTRAVENOUS EVERY 8 HOURS PRN
Status: DISCONTINUED | OUTPATIENT
Start: 2023-06-16 | End: 2023-06-18

## 2023-06-16 RX ORDER — HYDROMORPHONE HYDROCHLORIDE 1 MG/ML
0.2 INJECTION, SOLUTION INTRAMUSCULAR; INTRAVENOUS; SUBCUTANEOUS EVERY 5 MIN PRN
Status: ACTIVE | OUTPATIENT
Start: 2023-06-16 | End: 2023-06-16

## 2023-06-16 RX ORDER — HYDROCODONE BITARTRATE AND ACETAMINOPHEN 5; 325 MG/1; MG/1
1 TABLET ORAL ONCE AS NEEDED
Status: ACTIVE | OUTPATIENT
Start: 2023-06-16 | End: 2023-06-16

## 2023-06-16 RX ORDER — ONDANSETRON 2 MG/ML
4 INJECTION INTRAMUSCULAR; INTRAVENOUS EVERY 6 HOURS PRN
Status: DISCONTINUED | OUTPATIENT
Start: 2023-06-16 | End: 2023-06-18

## 2023-06-16 RX ORDER — HYDROCODONE BITARTRATE AND ACETAMINOPHEN 5; 325 MG/1; MG/1
2 TABLET ORAL ONCE AS NEEDED
Status: ACTIVE | OUTPATIENT
Start: 2023-06-16 | End: 2023-06-16

## 2023-06-16 RX ORDER — HYDROMORPHONE HYDROCHLORIDE 1 MG/ML
0.4 INJECTION, SOLUTION INTRAMUSCULAR; INTRAVENOUS; SUBCUTANEOUS EVERY 5 MIN PRN
Status: ACTIVE | OUTPATIENT
Start: 2023-06-16 | End: 2023-06-16

## 2023-06-16 RX ORDER — NALOXONE HYDROCHLORIDE 0.4 MG/ML
80 INJECTION, SOLUTION INTRAMUSCULAR; INTRAVENOUS; SUBCUTANEOUS AS NEEDED
Status: ACTIVE | OUTPATIENT
Start: 2023-06-16 | End: 2023-06-16

## 2023-06-16 RX ADMIN — SODIUM CHLORIDE, SODIUM LACTATE, POTASSIUM CHLORIDE, CALCIUM CHLORIDE: 600; 310; 30; 20 INJECTION, SOLUTION INTRAVENOUS at 13:01:00

## 2023-06-16 RX ADMIN — GADOTERATE MEGLUMINE 18 ML: 376.9 INJECTION INTRAVENOUS at 12:47:00

## 2023-06-16 RX ADMIN — SODIUM CHLORIDE, SODIUM LACTATE, POTASSIUM CHLORIDE, CALCIUM CHLORIDE: 600; 310; 30; 20 INJECTION, SOLUTION INTRAVENOUS at 12:00:00

## 2023-06-16 NOTE — DISCHARGE INSTRUCTIONS
[facility] Department of Radiology  MRI    Arrange for a responsible adult to stay with you  DO NOT drive for 24 hours  DO NOT take public transportation without the presence of responsible adult for 24 hours  Try to rest the day of the procedure  No strenuous activity (heavy lifting) for 48-72 hours)  DO NOT drink alcoholic beverages or take medication not specifically prescribed for 24 hours  Start taking sips of fluids and if tolerated then you can eat small meals the rest of the day  If you have any concerns, PLEASE DO NOT HESITATE to call any of these departments.     Radiology - [phone]  Emergency Room - [phone]  Your own physician

## 2023-06-19 LAB — LEVETIRACETAM LVL: 2.6 UG/ML

## 2023-07-17 DIAGNOSIS — F90.8 ATTENTION DEFICIT HYPERACTIVITY DISORDER (ADHD), OTHER TYPE: ICD-10-CM

## 2023-07-17 RX ORDER — DEXTROAMPHETAMINE SACCHARATE, AMPHETAMINE ASPARTATE, DEXTROAMPHETAMINE SULFATE AND AMPHETAMINE SULFATE 5; 5; 5; 5 MG/1; MG/1; MG/1; MG/1
20 TABLET ORAL EVERY MORNING
Qty: 30 TABLET | Refills: 0 | Status: SHIPPED | OUTPATIENT
Start: 2023-07-17

## 2023-07-17 NOTE — TELEPHONE ENCOUNTER
Medication: Adderall     Date of last refill: 6/15/23 (#30/0)  [place] 7/92/84     Last office visit: 5/3/23  Due back to clinic per last office note:  not indicated  Date next office visit scheduled:    No future appointments. Last OV note recommendation:    Assessment and PLAN (Recommendation/s):  Problems:  Demyelinating disease of central nervous system ([facility]  (primary encounter diagnosis)  Seizures ([facility]        Orders Placed This Encounter      MRI BRAIN (W+WO) (CPT=70553) [[id number]]  Her last MRI was in 2020 and and like to follow this up and compare in order to make a determination whether she has something progressive or not. Treatment deferred at this point.

## 2023-07-18 DIAGNOSIS — F90.8 ATTENTION DEFICIT HYPERACTIVITY DISORDER (ADHD), OTHER TYPE: ICD-10-CM

## 2023-07-18 RX ORDER — DEXTROAMPHETAMINE SACCHARATE, AMPHETAMINE ASPARTATE, DEXTROAMPHETAMINE SULFATE AND AMPHETAMINE SULFATE 5; 5; 5; 5 MG/1; MG/1; MG/1; MG/1
20 TABLET ORAL EVERY MORNING
Qty: 30 TABLET | Refills: 0 | Status: SHIPPED | OUTPATIENT
Start: 2023-07-18

## 2023-07-18 NOTE — TELEPHONE ENCOUNTER
amphetamine-dextroamphetamine 20 MG  refilled 7/17/23 to mail order pharmacy  Incorrect, should be sent to local    Pending

## 2023-08-21 DIAGNOSIS — F90.8 ATTENTION DEFICIT HYPERACTIVITY DISORDER (ADHD), OTHER TYPE: ICD-10-CM

## 2023-08-21 NOTE — TELEPHONE ENCOUNTER
Adderall   Sent to [facility] [address] [facility], [address], [phone], [phone]

## 2023-08-21 NOTE — TELEPHONE ENCOUNTER
Medication: amphetamine-dextroamphetamine 20 MG Oral Tab      Date of last refill: 7/18/2023 (#30/0)  [place] 96/92/9604     Last office visit: 05/03/2023  Due back to clinic per last office note:    Date next office visit scheduled:    No future appointments.         Last OV note recommendation:

## 2023-08-22 RX ORDER — DEXTROAMPHETAMINE SACCHARATE, AMPHETAMINE ASPARTATE, DEXTROAMPHETAMINE SULFATE AND AMPHETAMINE SULFATE 5; 5; 5; 5 MG/1; MG/1; MG/1; MG/1
20 TABLET ORAL EVERY MORNING
Qty: 30 TABLET | Refills: 0 | Status: SHIPPED | OUTPATIENT
Start: 2023-08-22

## 2023-08-22 NOTE — TELEPHONE ENCOUNTER
Pt. Called and said that [facility] hasn't filled her Aderal 20 mg. She wanted to send a reminder.     Please Advise

## 2023-09-25 DIAGNOSIS — F90.8 ATTENTION DEFICIT HYPERACTIVITY DISORDER (ADHD), OTHER TYPE: ICD-10-CM

## 2023-09-25 RX ORDER — DEXTROAMPHETAMINE SACCHARATE, AMPHETAMINE ASPARTATE, DEXTROAMPHETAMINE SULFATE AND AMPHETAMINE SULFATE 5; 5; 5; 5 MG/1; MG/1; MG/1; MG/1
20 TABLET ORAL EVERY MORNING
Qty: 30 TABLET | Refills: 0 | Status: SHIPPED | OUTPATIENT
Start: 2023-09-25

## 2023-09-25 NOTE — TELEPHONE ENCOUNTER
Medication: ADDERALL     Date of last refill: 8/22/2023 (#30/0)  [place] 0/55/7643     Last office visit: 5/3/2023  Due back to clinic per last office note:  na  Date next office visit scheduled:    Future Appointments   Date Time Provider Ivis Hdez   12/7/2023 11:20 AM Anahy Cortez PA [facility]  [address] note recommendation:     MRI BRAIN (W+WO) (CPT=70553) [[id number]]  Her last MRI was in 2020 and and like to follow this up and compare in order to make a determination whether she has something progressive or not. Treatment deferred at this point.         Pancho Montgomery MD  Neurology     Time visit started:  2:55 PM  Time completed:  3:01 PM

## 2023-10-27 DIAGNOSIS — F90.8 ATTENTION DEFICIT HYPERACTIVITY DISORDER (ADHD), OTHER TYPE: ICD-10-CM

## 2023-10-27 RX ORDER — DEXTROAMPHETAMINE SACCHARATE, AMPHETAMINE ASPARTATE, DEXTROAMPHETAMINE SULFATE AND AMPHETAMINE SULFATE 5; 5; 5; 5 MG/1; MG/1; MG/1; MG/1
20 TABLET ORAL EVERY MORNING
Qty: 30 TABLET | Refills: 0 | Status: SHIPPED | OUTPATIENT
Start: 2023-10-27

## 2023-10-27 NOTE — TELEPHONE ENCOUNTER
Patient calling, advised that she needs a refill on:    amphetamine-dextroamphetamine 20 MG Oral Tab     Please advise refill and send to:      Sylvia Miller [address] [facility], [address] [place] [address] AT [address] [place] [address], [phone], [phone]     States she will be out of medication before Monday, please fill today if possible. PT made aware of 48 business hour policy for refills.

## 2023-10-27 NOTE — TELEPHONE ENCOUNTER
Medication: amphetamine-dextroamphetamine 20 MG Oral Tab      Date of last refill: 9/25/2023 (#30/0)  [place] 2/40/7645     Last office visit: 5/3/2023  Due back to clinic per last office note:    Date next office visit scheduled:    Future Appointments   Date Time Provider Ivis Hdez   12/7/2023 11:20 AM Sejal Cortez PA [facility] [place]           Last OV note recommendation:

## 2023-11-30 DIAGNOSIS — F90.8 ATTENTION DEFICIT HYPERACTIVITY DISORDER (ADHD), OTHER TYPE: ICD-10-CM

## 2023-11-30 RX ORDER — DEXTROAMPHETAMINE SACCHARATE, AMPHETAMINE ASPARTATE, DEXTROAMPHETAMINE SULFATE AND AMPHETAMINE SULFATE 5; 5; 5; 5 MG/1; MG/1; MG/1; MG/1
20 TABLET ORAL EVERY MORNING
Qty: 30 TABLET | Refills: 0 | Status: SHIPPED | OUTPATIENT
Start: 2023-11-30

## 2023-11-30 NOTE — TELEPHONE ENCOUNTER
Medication: amphetamine-dextroamphetamine 20 MG Oral Tab      Date of last refill: 10/27/2023(#30/0)  [place]10/27/2023     Last office visit: 05/03/2023  Due back to clinic per last office note:    Date next office visit scheduled:    No future appointments.         Last OV note recommendation:

## 2024-01-03 DIAGNOSIS — F90.8 ATTENTION DEFICIT HYPERACTIVITY DISORDER (ADHD), OTHER TYPE: ICD-10-CM

## 2024-01-03 RX ORDER — DEXTROAMPHETAMINE SACCHARATE, AMPHETAMINE ASPARTATE, DEXTROAMPHETAMINE SULFATE AND AMPHETAMINE SULFATE 5; 5; 5; 5 MG/1; MG/1; MG/1; MG/1
20 TABLET ORAL EVERY MORNING
Qty: 30 TABLET | Refills: 0 | Status: SHIPPED | OUTPATIENT
Start: 2024-01-03

## 2024-01-24 ENCOUNTER — OFFICE VISIT (OUTPATIENT)
Dept: NEUROLOGY | Facility: CLINIC | Age: 64
End: 2024-01-24
Payer: COMMERCIAL

## 2024-01-24 VITALS
SYSTOLIC BLOOD PRESSURE: 131 MMHG | HEART RATE: 115 BPM | RESPIRATION RATE: 16 BRPM | BODY MASS INDEX: 29.62 KG/M2 | HEIGHT: 69 IN | WEIGHT: 200 LBS | DIASTOLIC BLOOD PRESSURE: 99 MMHG

## 2024-01-24 DIAGNOSIS — F90.8 ATTENTION DEFICIT HYPERACTIVITY DISORDER (ADHD), OTHER TYPE: ICD-10-CM

## 2024-01-24 DIAGNOSIS — G37.9 DEMYELINATING DISEASE OF CENTRAL NERVOUS SYSTEM (HCC): Primary | ICD-10-CM

## 2024-01-24 DIAGNOSIS — R56.9 SEIZURES (HCC): ICD-10-CM

## 2024-01-24 PROCEDURE — 99214 OFFICE O/P EST MOD 30 MIN: CPT | Performed by: OTHER

## 2024-01-24 PROCEDURE — 3008F BODY MASS INDEX DOCD: CPT | Performed by: OTHER

## 2024-01-24 PROCEDURE — 3075F SYST BP GE 130 - 139MM HG: CPT | Performed by: OTHER

## 2024-01-24 PROCEDURE — 3080F DIAST BP >= 90 MM HG: CPT | Performed by: OTHER

## 2024-01-24 NOTE — PROGRESS NOTES
NEUROLOGY  [facility]    Awilda Davis Moffet  4/19/1960  Primary Care Provider:  Peter Ann DO    1/24/2024  Accompanied visit:     (x) No.      63 year old yo,  was last seen on:: Nov 2022    Seen for:  Demyelinating disease of central nervous system (HCC)  (primary encounter diagnosis)  Attention deficit hyperactivity disorder (ADHD), other type  Seizures (HCC)  Rheumatoid arthritis involving both wrists with negative rheumatoid factor (HCC)       Previous visit and existing record notes reviewed in preparation for the face to face visit.  Relevant labs and studies reviewed and will be noted in relevant areas of this record.      Present condition:  Most problem relates to psoriatic skin lesions in the feet and itching hands    Wobbles with gait  No seizures  Adderall has helped with mood and focusing  Some issues of rainbow vision with eyes no headaches associated    Past History update/new problem(s): none    Review of Systems:  Review of Systems:  Denies systemic symptoms     No CP or SOB.  No GI or  symptoms. Relevant Neuro as noted above.      Medications:      Current Outpatient Medications:     amphetamine-dextroamphetamine 20 MG Oral Tab, Take 1 tablet (20 mg total) by mouth every morning., Disp: 30 tablet, Rfl: 0    traMADol 50 MG Oral Tab, Take 1 tablet (50 mg total) by mouth every 6 (six) hours as needed for Pain. PATIENT TAKES 3 TABLETS IN THE AM. PATIENT TAKES 2 TABLETS AFTERNOON.AND 3 TABLETS  IN THE EVENING, Disp: , Rfl:     pregabalin 150 MG Oral Cap, Take 1 capsule (150 mg total) by mouth 3 (three) times daily., Disp: , Rfl:     LEVETIRACETAM  MG Oral Tablet 24 Hr, TAKE 2 TABLETS BY MOUTH DAILY, Disp: 60 tablet, Rfl: 2    Betamethasone Dipropionate 0.05 % External Ointment, Apply thin layer to affected area at feet twice daily for three weeks followed by a one week break.  Repeat as needed., Disp: , Rfl:     LEFLUNOMIDE 20  MG Oral Tab, TAKE 1 TABLET BY MOUTH EVERY DAY, Disp: 90 tablet, Rfl: 0    diazepam 10 MG Oral Tab, Take 1 tablet (10 mg total) by mouth 2 (two) times daily as needed., Disp: , Rfl:     Metoprolol Succinate ER 25 MG Oral Tablet 24 Hr, Take 2 tablets (50 mg total) by mouth daily., Disp: , Rfl:     SUMAtriptan Succinate 100 MG Oral Tab, TAKE 1 TABLET BY MOUTH 1 TIME FOR UP TO 1 DOSE AS NEEDED FOR MIGRAINE, Disp: , Rfl:     Pseudoephedrine HCl 30 MG Oral Tab, Take 1 tablet (30 mg total) by mouth every 4 (four) hours as needed for congestion., Disp: , Rfl:     Albuterol Sulfate  (90 BASE) MCG/ACT Inhalation Aero Soln, Inhale 2 puffs into the lungs every 6 (six) hours as needed for Wheezing., Disp: 6.7 g, Rfl: 1  PRN:     Allergies:  Allergies   Allergen Reactions    Sulfa Antibiotics HIVES    Latex RASH          EXAM:  BP (!) 131/99 (BP Location: Right arm, Patient Position: Sitting, Cuff Size: large)   Pulse 115   Resp 16   Ht 69\"   Wt 200 lb (90.7 kg)   LMP 03/19/2010   BMI 29.53 kg/m²   Looks stated age  General Exam:  HENT:  pink conjunctiva anicteric sclerae  Neck no adenopathy, thyroid normal  Heart and Lungs:  normal  Extremities: no cyanosis, skin changes    NEURO  Alert oriented speech fluent  Comprehension is intact  CN grossly normal  No drift  Gait slow and cautious steps        INTERPRETATION of RELEVANT LABS and other DATA:    Impression   CONCLUSION:  Mild burden of supratentorial demyelinating disease which is unchanged compared to 9/24/2020.  No new lesion or active demyelination.         Problem/s Identified this visit:   1. Demyelinating disease of central nervous system (HCC)    2. Seizures (HCC)    3. Attention deficit hyperactivity disorder (ADHD), other type          Discussion plus Diagnostics & Treatment Orders:  Continue present Adderal and Leflunomide for her Rheumatologist        (x) Discussed potential side effects of any treatment relevant to above.  Includes explanation of  tests as necessary.    Return in about 1 year (around 1/24/2025).      Patient understands that if needed, based on condition and or test results, follow up will be readjusted      Jose Alberto Brown MD  Vascular & General Neurology  Director, Multiple Sclerosis Program  [facility]  1/24/2024, Time completed 10:03 AM    Decision making:  ( x ) labs reviewed/ordered - 1  (  ) new diagnosis: - 1  ( x) Images & studies independently reviewed -non F2F  (  ) Case/studies discussed with other caregivers - -non F2F  (  ) Telephone time with patiern or authorized Fam member--non F2F  ( x ) other records reviewed --non F2F including consultations  (  ) [facility] meetings - patient not present --non F2F  (  ) Independent Historian obtained    Non Face to Face CPT code 07268/72716 applies as documented above    PROCEDURE DONE     (   ) see notes        After visit, patient was escorted out and handed-off discharge process and instructions to the check out desk.  No additional issues relevant to visit were raised to staff at this time interval.        This document is to be interpreted as my current opinion regarding the case as of the stated date of service based on the information available to me at this time and may supersedes any prior opinion expressed either orally or in writing.  Services rendered are only within the scope of direct medical care  Sometimes, reports may have been prepared partially using a speech recognition software technology.  If a word or phrase is confusing or out of context, please do not hesitate to call for clarification.

## 2024-01-24 NOTE — PATIENT INSTRUCTIONS
Refill policies:    Allow 2-3 business days for refills; controlled substances may take longer.  Contact your pharmacy at least 5 days prior to running out of medication and have them send an electronic request or submit request through the “request refill” option in your [employer] account.  Refills are not addressed on weekends; covering physicians do not authorize routine medications on weekends.  No narcotics or controlled substances are refilled after noon on Fridays or by on call physicians.  By law, narcotics must be electronically prescribed.  A 30 day supply with no refills is the maximum allowed.  If your prescription is due for a refill, you may be due for a follow up appointment.  To best provide you care, patients receiving routine medications need to be seen at least once a year.  Patients receiving narcotic/controlled substance medications need to be seen at least once every 3 months.  In the event that your preferred pharmacy does not have the requested medication in stock (e.g. Backordered), it is your responsibility to find another pharmacy that has the requested medication available.  We will gladly send a new prescription to that pharmacy at your request.    Scheduling Tests:    If your physician has ordered radiology tests such as MRI or CT scans, please contact Central Scheduling at [phone] right away to schedule the test.  Once scheduled, the [facility] Centralized Referral Team will work with your insurance carrier to obtain pre-certification or prior authorization.  Depending on your insurance carrier, approval may take 3-10 days.  It is highly recommended patients assure they have received an authorization before having a test performed.  If test is done without insurance authorization, patient may be responsible for the entire amount billed.      Precertification and Prior Authorizations:  If your physician has recommended that you have a procedure or additional testing performed the [facility]  Centralized Referral Team will contact your insurance carrier to obtain pre-certification or prior authorization.    You are strongly encouraged to contact your insurance carrier to verify that your procedure/test has been approved and is a COVERED benefit.  Although the [facility] Centralized Referral Team does its due diligence, the insurance carrier gives the disclaimer that \"Although the procedure is authorized, this does not guarantee payment.\"    Ultimately the patient is responsible for payment.   Thank you for your understanding in this matter.  Paperwork Completion:  If you require FMLA or disability paperwork for your recovery, please make sure to either drop it off or have it faxed to our office at [phone]. Be sure the form has your name and date of birth on it.  The form will be faxed to our Forms Department and they will complete it for you.  There is a 25$ fee for all forms that need to be filled out.  Please be aware there is a 10-14 day turnaround time.  You will need to sign a release of information (ELLIS) form if your paperwork does not come with one.  You may call the Forms Department with any questions at [phone].  Their fax number is [phone].

## 2024-02-06 DIAGNOSIS — F90.8 ATTENTION DEFICIT HYPERACTIVITY DISORDER (ADHD), OTHER TYPE: ICD-10-CM

## 2024-02-06 RX ORDER — DEXTROAMPHETAMINE SACCHARATE, AMPHETAMINE ASPARTATE, DEXTROAMPHETAMINE SULFATE AND AMPHETAMINE SULFATE 5; 5; 5; 5 MG/1; MG/1; MG/1; MG/1
20 TABLET ORAL EVERY MORNING
Qty: 30 TABLET | Refills: 0 | Status: SHIPPED | OUTPATIENT
Start: 2024-02-06

## 2024-02-06 NOTE — TELEPHONE ENCOUNTER
Medication: amphetamine-dextroamphetamine 20 MG      Date of last refill: 1/3/25 (#30/0R)  [place]24     Last office visit: 1/24/24  Due back to clinic per last office note:  1 yr  Date next office visit scheduled:    No future appointments.        Last OV note recommendation:    Problem/s Identified this visit:   1. Demyelinating disease of central nervous system (HCC)    2. Seizures (HCC)    3. Attention deficit hyperactivity disorder (ADHD), other type             Discussion plus Diagnostics & Treatment Orders:  Continue present Adderal and Leflunomide for her Rheumatologist

## 2024-03-11 DIAGNOSIS — F90.8 ATTENTION DEFICIT HYPERACTIVITY DISORDER (ADHD), OTHER TYPE: ICD-10-CM

## 2024-03-11 RX ORDER — DEXTROAMPHETAMINE SACCHARATE, AMPHETAMINE ASPARTATE, DEXTROAMPHETAMINE SULFATE AND AMPHETAMINE SULFATE 5; 5; 5; 5 MG/1; MG/1; MG/1; MG/1
20 TABLET ORAL EVERY MORNING
Qty: 30 TABLET | Refills: 0 | Status: SHIPPED | OUTPATIENT
Start: 2024-03-11

## 2024-03-11 NOTE — TELEPHONE ENCOUNTER
Medication: Amphetamine-Dextroamphetamine 20 MG Oral Tablet      Date of last refill: 2/6/24 (#30/0)  [place]24     Last office visit: 1/24/24  Due back to clinic per last office note:  1 year  Date next office visit scheduled:    No future appointments.        Last OV note recommendation:    Problem/s Identified this visit:   1. Demyelinating disease of central nervous system (HCC)    2. Seizures (HCC)    3. Attention deficit hyperactivity disorder (ADHD), other type             Discussion plus Diagnostics & Treatment Orders:  Continue present Adderal and Leflunomide for her Rheumatologis

## 2024-05-17 DIAGNOSIS — F90.8 ATTENTION DEFICIT HYPERACTIVITY DISORDER (ADHD), OTHER TYPE: ICD-10-CM

## 2024-05-20 RX ORDER — DEXTROAMPHETAMINE SACCHARATE, AMPHETAMINE ASPARTATE, DEXTROAMPHETAMINE SULFATE AND AMPHETAMINE SULFATE 5; 5; 5; 5 MG/1; MG/1; MG/1; MG/1
20 TABLET ORAL EVERY MORNING
Qty: 30 TABLET | Refills: 0 | Status: SHIPPED | OUTPATIENT
Start: 2024-05-20

## 2024-05-20 NOTE — TELEPHONE ENCOUNTER
Medication: amphetamine-dextroamphetamine 20 MG      Date of last refill: 03/11/2024)  [place]17/2024     Last office visit: 1/24/24  Due back to clinic per last office note:  1 yr  Date next office visit scheduled:    No future appointments.        Last OV note recommendation:     Problem/s Identified this visit:   1. Demyelinating disease of central nervous system (HCC)    2. Seizures (HCC)    3. Attention deficit hyperactivity disorder (ADHD), other type             Discussion plus Diagnostics & Treatment Orders:  Continue present Adderal and Leflunomide for her Rheumatologist

## 2024-05-20 NOTE — TELEPHONE ENCOUNTER
Patient Comment: Hi I meant to call actually yesterday or today would you ever closed when I remembered I am out of my script as of today. My bad. So hopefully it can be taken care of on Monday. You all are very good with fellow through and I appreciate it. Thank you much, Awilda

## 2024-06-24 DIAGNOSIS — F90.8 ATTENTION DEFICIT HYPERACTIVITY DISORDER (ADHD), OTHER TYPE: ICD-10-CM

## 2024-06-24 NOTE — TELEPHONE ENCOUNTER
Medication: amphetamine-dextroamphetamine 20 MG      Date of last refill: 5/20/24 (#30/0R)  [place]24     Last office visit: 1/24/24  Due back to clinic per last office note:  1 yr  Date next office visit scheduled:    No future appointments.        Last OV note recommendation:    Problem/s Identified this visit:   1. Demyelinating disease of central nervous system (HCC)    2. Seizures (HCC)    3. Attention deficit hyperactivity disorder (ADHD), other type             Discussion plus Diagnostics & Treatment Orders:  Continue present Adderal and Leflunomide for her Rheumatologist

## 2024-06-25 RX ORDER — DEXTROAMPHETAMINE SACCHARATE, AMPHETAMINE ASPARTATE, DEXTROAMPHETAMINE SULFATE AND AMPHETAMINE SULFATE 5; 5; 5; 5 MG/1; MG/1; MG/1; MG/1
20 TABLET ORAL EVERY MORNING
Qty: 30 TABLET | Refills: 0 | Status: SHIPPED | OUTPATIENT
Start: 2024-06-25

## 2024-06-25 NOTE — TELEPHONE ENCOUNTER
Patient calling asking about medication order. Provider returned from being out of office and is currently in clinic.   Order to be reviewed by provider.

## 2024-07-08 NOTE — TELEPHONE ENCOUNTER
Spoke with patient who states she is unable to tolerate steroids so is refusing. Does not understand \"why cant' you do anything for me? \"  Informed patient that we are suggesting steroids which she is unable to tolerate, but she can get her glasses fixed, Yes-Patient/Caregiver accepts free interpretation services...

## 2024-07-26 DIAGNOSIS — F90.8 OTHER SPECIFIED ATTENTION DEFICIT HYPERACTIVITY DISORDER (ADHD): ICD-10-CM

## 2024-07-26 NOTE — TELEPHONE ENCOUNTER
Patient calling, advised that she has one more day of medication:    amphetamine-dextroamphetamine 20 MG Oral Tab     Please advise and send to:    [id number] - [place], [place] - [address] AT [address] [facility] [address], [phone], [phone]     Patient was made aware of turn around policy.

## 2024-07-26 NOTE — TELEPHONE ENCOUNTER
Medication: amphetamine-dextroamphetamine 20 mg oral tab     Date of last refill: 4/17/2024 (#30/0)  [place]25/2024     Last office visit: 1/24/2025  Due back to clinic per last office note:  one year  Date next office visit scheduled:    No future appointments.        Last OV note recommendation:    Discussion plus Diagnostics & Treatment Orders:  Continue present Adderal and Leflunomide for her Rheumatologist           (x) Discussed potential side effects of any treatment relevant to above.  Includes explanation of tests as necessary.     Return in about 1 year (around 1/24/2025).

## 2024-07-29 RX ORDER — DEXTROAMPHETAMINE SACCHARATE, AMPHETAMINE ASPARTATE, DEXTROAMPHETAMINE SULFATE AND AMPHETAMINE SULFATE 5; 5; 5; 5 MG/1; MG/1; MG/1; MG/1
20 TABLET ORAL EVERY MORNING
Qty: 30 TABLET | Refills: 0 | Status: SHIPPED | OUTPATIENT
Start: 2024-07-29 | End: 2024-07-29

## 2024-07-29 RX ORDER — DEXTROAMPHETAMINE SACCHARATE, AMPHETAMINE ASPARTATE, DEXTROAMPHETAMINE SULFATE AND AMPHETAMINE SULFATE 5; 5; 5; 5 MG/1; MG/1; MG/1; MG/1
20 TABLET ORAL EVERY MORNING
Qty: 30 TABLET | Refills: 0 | Status: SHIPPED | OUTPATIENT
Start: 2024-07-29

## 2024-09-03 DIAGNOSIS — F90.8 OTHER SPECIFIED ATTENTION DEFICIT HYPERACTIVITY DISORDER (ADHD): ICD-10-CM

## 2024-09-03 RX ORDER — LEVETIRACETAM 750 MG/1
1500 TABLET, FILM COATED, EXTENDED RELEASE ORAL DAILY
Qty: 60 TABLET | Refills: 2 | Status: SHIPPED | OUTPATIENT
Start: 2024-09-03

## 2024-09-03 RX ORDER — DEXTROAMPHETAMINE SACCHARATE, AMPHETAMINE ASPARTATE, DEXTROAMPHETAMINE SULFATE AND AMPHETAMINE SULFATE 5; 5; 5; 5 MG/1; MG/1; MG/1; MG/1
20 TABLET ORAL EVERY MORNING
Qty: 30 TABLET | Refills: 0 | Status: SHIPPED | OUTPATIENT
Start: 2024-09-03

## 2024-09-03 NOTE — TELEPHONE ENCOUNTER
Medication: amphetamine-dextroamphetamine 20 MG Oral Tab      Date of last refill: 7/29/24 (#30/0)  [place]/24       Medication: LEVETIRACETAM  MG Oral Tablet 24 Hr      Date of last refill: 1/3/23 (#60/2)  [place]     Last office visit: 1/24/24  Due back to clinic per last office note:  Return in about 1 year   Date next office visit scheduled:    No future appointments.        Last OV note recommendation:    Problem/s Identified this visit:   1. Demyelinating disease of central nervous system (HCC)    2. Seizures (HCC)    3. Attention deficit hyperactivity disorder (ADHD), other type             Discussion plus Diagnostics & Treatment Orders:  Continue present Adderal and Leflunomide for her Rheumatologist           (x) Discussed potential side effects of any treatment relevant to above.  Includes explanation of tests as necessary.     Return in about 1 year (around 1/24/2025).        Patient understands that if needed, based on condition and or test results, follow up will be readjusted        Jose Alberto Brown MD

## 2024-10-15 DIAGNOSIS — F90.8 OTHER SPECIFIED ATTENTION DEFICIT HYPERACTIVITY DISORDER (ADHD): ICD-10-CM

## 2024-10-15 RX ORDER — DEXTROAMPHETAMINE SACCHARATE, AMPHETAMINE ASPARTATE, DEXTROAMPHETAMINE SULFATE AND AMPHETAMINE SULFATE 5; 5; 5; 5 MG/1; MG/1; MG/1; MG/1
20 TABLET ORAL EVERY MORNING
Qty: 30 TABLET | Refills: 0 | Status: SHIPPED | OUTPATIENT
Start: 2024-10-15

## 2024-10-15 NOTE — TELEPHONE ENCOUNTER
Medication: amphetamine-dextroamphetamine 20 MG Oral Tab      Date of last refill: 9/3/24 (#30/0)  [place]/24     Last office visit: 1/24/24  Due back to clinic per last office note:  Return in about 1 year   Date next office visit scheduled:    No future appointments.        Last OV note recommendation:    Problem/s Identified this visit:   1. Demyelinating disease of central nervous system (HCC)    2. Seizures (HCC)    3. Attention deficit hyperactivity disorder (ADHD), other type             Discussion plus Diagnostics & Treatment Orders:  Continue present Adderal and Leflunomide for her Rheumatologist           (x) Discussed potential side effects of any treatment relevant to above.  Includes explanation of tests as necessary.     Return in about 1 year (around 1/24/2025).        Patient understands that if needed, based on condition and or test results, follow up will be readjusted        Jose Alberto Brown MD

## 2024-11-19 DIAGNOSIS — F90.8 OTHER SPECIFIED ATTENTION DEFICIT HYPERACTIVITY DISORDER (ADHD): ICD-10-CM

## 2024-11-19 RX ORDER — DEXTROAMPHETAMINE SACCHARATE, AMPHETAMINE ASPARTATE, DEXTROAMPHETAMINE SULFATE AND AMPHETAMINE SULFATE 5; 5; 5; 5 MG/1; MG/1; MG/1; MG/1
20 TABLET ORAL EVERY MORNING
Qty: 30 TABLET | Refills: 0 | Status: SHIPPED | OUTPATIENT
Start: 2024-11-19

## 2024-11-19 NOTE — TELEPHONE ENCOUNTER
Medication: amphetamine-dextroamphetamine 20 MG Oral Tab      Date of last refill: 10/15/24 (#30/0)  [place]24     Last office visit: 1/24/24  Due back to clinic per last office note:  Return in about 1 year (around 1/24/2025).   Date next office visit scheduled:    Future Appointments   Date Time Provider Department Center   2/12/2025  1:20 PM [facility]           Last OV note recommendation:    Problem/s Identified this visit:   1. Demyelinating disease of central nervous system (HCC)    2. Seizures (HCC)    3. Attention deficit hyperactivity disorder (ADHD), other type             Discussion plus Diagnostics & Treatment Orders:  Continue present Adderal and Leflunomide for her Rheumatologist           (x) Discussed potential side effects of any treatment relevant to above.  Includes explanation of tests as necessary.     Return in about 1 year (around 1/24/2025).        Patient understands that if needed, based on condition and or test results, follow up will be readjusted        Jose Alberto Brown MD

## 2024-12-20 DIAGNOSIS — F90.8 OTHER SPECIFIED ATTENTION DEFICIT HYPERACTIVITY DISORDER (ADHD): ICD-10-CM

## 2024-12-20 NOTE — TELEPHONE ENCOUNTER
Medication: amphetamine-dextroamphetamine 20 MG      Date of last refill: 11/19/24 (#30/0R)  [place]19[place]24     Last office visit: 1/24/24  Due back to clinic per last office note:  1 yr  Date next office visit scheduled:    Future Appointments   Date Time Provider Department Center   1/22/2025  1:20 PM Jose Alberto Brown MD [facility]           Last OV note recommendation:    Problem/s Identified this visit:   1. Demyelinating disease of central nervous system (HCC)    2. Seizures (HCC)    3. Attention deficit hyperactivity disorder (ADHD), other type             Discussion plus Diagnostics & Treatment Orders:  Continue present Adderal and Leflunomide for her Rheumatologist

## 2024-12-20 NOTE — TELEPHONE ENCOUNTER
Patient calling, requesting refill on:    amphetamine-dextroamphetamine 20 MG Oral Tab     Please advise and send to:    [id number] - [place], [place] - [address] AT [address], [phone], [phone]

## 2024-12-21 RX ORDER — DEXTROAMPHETAMINE SACCHARATE, AMPHETAMINE ASPARTATE, DEXTROAMPHETAMINE SULFATE AND AMPHETAMINE SULFATE 5; 5; 5; 5 MG/1; MG/1; MG/1; MG/1
20 TABLET ORAL EVERY MORNING
Qty: 30 TABLET | Refills: 0 | Status: SHIPPED | OUTPATIENT
Start: 2024-12-21

## 2024-12-23 ENCOUNTER — TELEPHONE (OUTPATIENT)
Dept: NEUROLOGY | Facility: CLINIC | Age: 64
End: 2024-12-23

## 2024-12-23 NOTE — TELEPHONE ENCOUNTER
PA requested for D-amphetamine  Clinical questions answered and submitted to insurance  Awaiting coverage determination

## 2024-12-30 NOTE — TELEPHONE ENCOUNTER
Received fax from [employer]   Approval received for patient use of Dextroamp-Amphetamin 20mg   Approval granted: 12/27/24-12/26/26        Pt notified

## 2025-01-23 DIAGNOSIS — F90.8 OTHER SPECIFIED ATTENTION DEFICIT HYPERACTIVITY DISORDER (ADHD): ICD-10-CM

## 2025-01-23 RX ORDER — DEXTROAMPHETAMINE SACCHARATE, AMPHETAMINE ASPARTATE, DEXTROAMPHETAMINE SULFATE AND AMPHETAMINE SULFATE 5; 5; 5; 5 MG/1; MG/1; MG/1; MG/1
20 TABLET ORAL EVERY MORNING
Qty: 30 TABLET | Refills: 0 | Status: SHIPPED | OUTPATIENT
Start: 2025-01-23

## 2025-01-23 NOTE — TELEPHONE ENCOUNTER
Medication: amphetamine-dextroamphetamine 20 MG      Date of last refill: 12/21/24 (#30/0R)  [place]24     Last office visit: 1/24/24  Due back to clinic per last office note:  1 yr  Date next office visit scheduled:    No future appointments.        Last OV note recommendation:    Problem/s Identified this visit:   1. Demyelinating disease of central nervous system (HCC)    2. Seizures (HCC)    3. Attention deficit hyperactivity disorder (ADHD), other type             Discussion plus Diagnostics & Treatment Orders:  Continue present Adderal and Leflunomide for her Rheumatologist      MyChart msg sent to pt notifying due for appt

## 2025-01-27 ENCOUNTER — TELEPHONE (OUTPATIENT)
Dept: NEUROLOGY | Facility: CLINIC | Age: 65
End: 2025-01-27

## 2025-01-27 NOTE — TELEPHONE ENCOUNTER
Patient called to say she missed appt on 1/22/2025 due to upper respiratory infection. States she has been sick a lot lately, states she HAS been following with PCP.  Advised patient to schedule first available with Dr Brown or Jenni Cortez PA-C.  Patient also states she is starting COSENTYX very soon, despite still having respiratory symptoms and generally feeling unwell. Feels she needs to start now, as she is not well much of the time and needs to start this medication.  States she has not inform Rheumatologist that she is starting medication while feeling ill.  Advise she call and let Rheum know her plan.  Verbalized understanding.    Routed to  to schedule with neurology.

## 2025-02-11 RX ORDER — LEVETIRACETAM 750 MG/1
1500 TABLET, FILM COATED, EXTENDED RELEASE ORAL DAILY
Qty: 180 TABLET | Refills: 0 | Status: SHIPPED | OUTPATIENT
Start: 2025-02-11

## 2025-02-11 NOTE — TELEPHONE ENCOUNTER
Medication: Levetiracetam 750 mg ER      Date of last refill: 09/30/2024 #60/2)  [place]      Last office visit: 1/24/24  Due back to clinic per last office note:  Return in about 1 year (around 1/24/2025).   Date next office visit scheduled:  04/21/2025         Future Appointments           Last OV note recommendation:     Problem/s Identified this visit:   1. Demyelinating disease of central nervous system (HCC)    2. Seizures (HCC)    3. Attention deficit hyperactivity disorder (ADHD), other type             Discussion plus Diagnostics & Treatment Orders:  Continue present Adderal and Leflunomide for her Rheumatologist           (x) Discussed potential side effects of any treatment relevant to above.  Includes explanation of tests as necessary.     Return in about 1 year (around 1/24/2025).

## 2025-02-28 DIAGNOSIS — F90.8 OTHER SPECIFIED ATTENTION DEFICIT HYPERACTIVITY DISORDER (ADHD): ICD-10-CM

## 2025-02-28 RX ORDER — DEXTROAMPHETAMINE SACCHARATE, AMPHETAMINE ASPARTATE, DEXTROAMPHETAMINE SULFATE AND AMPHETAMINE SULFATE 5; 5; 5; 5 MG/1; MG/1; MG/1; MG/1
20 TABLET ORAL EVERY MORNING
Qty: 30 TABLET | Refills: 0 | Status: SHIPPED | OUTPATIENT
Start: 2025-02-28

## 2025-02-28 NOTE — TELEPHONE ENCOUNTER
Medication: Adderall 20mg ER      Date of last refill: 01/23/2025 #60  [place]      Last office visit: 1/24/24  Due back to clinic per last office note:  Return in about 1 year (around 1/24/2025).   Date next office visit scheduled:  04/21/2025              Future Appointments            Last OV note recommendation:     Problem/s Identified this visit:   1. Demyelinating disease of central nervous system (HCC)    2. Seizures (HCC)    3. Attention deficit hyperactivity disorder (ADHD), other type             Discussion plus Diagnostics & Treatment Orders:  Continue present Adderal and Leflunomide for her Rheumatologist           (x) Discussed potential side effects of any treatment relevant to above.  Includes explanation of tests as necessary.     Return in about 1 year (around 1/24/2025).

## 2025-04-17 DIAGNOSIS — F90.8 OTHER SPECIFIED ATTENTION DEFICIT HYPERACTIVITY DISORDER (ADHD): ICD-10-CM

## 2025-04-17 RX ORDER — DEXTROAMPHETAMINE SACCHARATE, AMPHETAMINE ASPARTATE, DEXTROAMPHETAMINE SULFATE AND AMPHETAMINE SULFATE 5; 5; 5; 5 MG/1; MG/1; MG/1; MG/1
20 TABLET ORAL EVERY MORNING
Qty: 30 TABLET | Refills: 0 | Status: SHIPPED | OUTPATIENT
Start: 2025-04-17

## 2025-04-17 NOTE — TELEPHONE ENCOUNTER
Medication: Adderall 20mg     Date of last refill: 2/28/25 (#30/0R)  [place]25     Last office visit: 1/24/24  Due back to clinic per last office note:  1 yr  Date next office visit scheduled:    Future Appointments   Date Time Provider Department Center   4/21/2025  2:00 PM Jose Alberto Brown MD [facility]           Last OV note recommendation:    Problem/s Identified this visit:   1. Demyelinating disease of central nervous system (HCC)    2. Seizures (HCC)    3. Attention deficit hyperactivity disorder (ADHD), other type             Discussion plus Diagnostics & Treatment Orders:  Continue present Adderal and Leflunomide for her Rheumatologist

## 2025-04-21 ENCOUNTER — OFFICE VISIT (OUTPATIENT)
Dept: NEUROLOGY | Facility: CLINIC | Age: 65
End: 2025-04-21
Payer: COMMERCIAL

## 2025-04-21 VITALS
WEIGHT: 200 LBS | RESPIRATION RATE: 16 BRPM | HEART RATE: 97 BPM | SYSTOLIC BLOOD PRESSURE: 132 MMHG | DIASTOLIC BLOOD PRESSURE: 88 MMHG | HEIGHT: 69 IN | BODY MASS INDEX: 29.62 KG/M2

## 2025-04-21 DIAGNOSIS — G47.10 HYPERSOMNIA: ICD-10-CM

## 2025-04-21 DIAGNOSIS — S32.020S COMPRESSION FRACTURE OF L2 VERTEBRA, SEQUELA: ICD-10-CM

## 2025-04-21 DIAGNOSIS — G37.9 DEMYELINATING DISEASE OF CENTRAL NERVOUS SYSTEM (HCC): Primary | ICD-10-CM

## 2025-04-21 DIAGNOSIS — R56.9 SEIZURES (HCC): ICD-10-CM

## 2025-04-21 PROCEDURE — 3079F DIAST BP 80-89 MM HG: CPT | Performed by: OTHER

## 2025-04-21 PROCEDURE — 3075F SYST BP GE 130 - 139MM HG: CPT | Performed by: OTHER

## 2025-04-21 PROCEDURE — 99214 OFFICE O/P EST MOD 30 MIN: CPT | Performed by: OTHER

## 2025-04-21 PROCEDURE — 3008F BODY MASS INDEX DOCD: CPT | Performed by: OTHER

## 2025-04-21 RX ORDER — SECUKINUMAB 150 MG/ML
150 INJECTION SUBCUTANEOUS
COMMUNITY
Start: 2024-12-24

## 2025-04-21 RX ORDER — METOPROLOL SUCCINATE 50 MG/1
50 TABLET, EXTENDED RELEASE ORAL DAILY
COMMUNITY
Start: 2025-03-26

## 2025-04-21 RX ORDER — ATORVASTATIN CALCIUM 40 MG/1
40 TABLET, FILM COATED ORAL DAILY
COMMUNITY
Start: 2025-01-26

## 2025-04-21 RX ORDER — LEVOTHYROXINE SODIUM 25 UG/1
25 TABLET ORAL
COMMUNITY
Start: 2024-03-11

## 2025-04-21 NOTE — PATIENT INSTRUCTIONS
Refill policies:    Allow 2-3 business days for refills; controlled substances may take longer.  Contact your pharmacy at least 5 days prior to running out of medication and have them send an electronic request or submit request through the “request refill” option in your [employer] account.  Refills are not addressed on weekends; covering physicians do not authorize routine medications on weekends.  No narcotics or controlled substances are refilled after noon on Fridays or by on call physicians.  By law, narcotics must be electronically prescribed.  A 30 day supply with no refills is the maximum allowed.  If your prescription is due for a refill, you may be due for a follow up appointment.  To best provide you care, patients receiving routine medications need to be seen at least once a year.  Patients receiving narcotic/controlled substance medications need to be seen at least once every 3 months.  In the event that your preferred pharmacy does not have the requested medication in stock (e.g. Backordered), it is your responsibility to find another pharmacy that has the requested medication available.  We will gladly send a new prescription to that pharmacy at your request.    Scheduling Tests:    If your physician has ordered radiology tests such as MRI or CT scans, please contact Central Scheduling at [phone] right away to schedule the test.  Once scheduled, the [facility] Centralized Referral Team will work with your insurance carrier to obtain pre-certification or prior authorization.  Depending on your insurance carrier, approval may take 3-10 days.  It is highly recommended patients assure they have received an authorization before having a test performed.  If test is done without insurance authorization, patient may be responsible for the entire amount billed.      Precertification and Prior Authorizations:  If your physician has recommended that you have a procedure or additional testing performed the [facility]  Centralized Referral Team will contact your insurance carrier to obtain pre-certification or prior authorization.    You are strongly encouraged to contact your insurance carrier to verify that your procedure/test has been approved and is a COVERED benefit.  Although the [facility] Centralized Referral Team does its due diligence, the insurance carrier gives the disclaimer that \"Although the procedure is authorized, this does not guarantee payment.\"    Ultimately the patient is responsible for payment.   Thank you for your understanding in this matter.  Paperwork Completion:  If you require FMLA or disability paperwork for your recovery, please make sure to either drop it off or have it faxed to our office at [phone]. Be sure the form has your name and date of birth on it.  The form will be faxed to our Forms Department and they will complete it for you.  There is a 25$ fee for all forms that need to be filled out.  Please be aware there is a 10-14 day turnaround time.  You will need to sign a release of information (ELLIS) form if your paperwork does not come with one.  You may call the Forms Department with any questions at [phone].  Their fax number is [phone].
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
## 2025-05-28 DIAGNOSIS — F90.8 OTHER SPECIFIED ATTENTION DEFICIT HYPERACTIVITY DISORDER (ADHD): ICD-10-CM

## 2025-05-28 RX ORDER — DEXTROAMPHETAMINE SACCHARATE, AMPHETAMINE ASPARTATE, DEXTROAMPHETAMINE SULFATE AND AMPHETAMINE SULFATE 5; 5; 5; 5 MG/1; MG/1; MG/1; MG/1
20 TABLET ORAL EVERY MORNING
Qty: 30 TABLET | Refills: 0 | Status: SHIPPED | OUTPATIENT
Start: 2025-05-28

## 2025-05-28 NOTE — TELEPHONE ENCOUNTER
Patient is requesting refill of amphetamine-dextroamphetamine 20 MG Oral Tab to be sent to [facility] on [facility] in [place]. Thank you.

## 2025-05-28 NOTE — TELEPHONE ENCOUNTER
Medication: amphetamine-dextroamphetamine 20 MG Oral Tab      Date of last refill: 4/17/25 (#30/0)  [place]28[place]25     Last office visit: 4/21/25  Due back to clinic per last office note:    Date next office visit scheduled:    Future Appointments   Date Time Provider Department Center   12/4/2025  2:40 PM Jose Alberto Brown MD [facility]           Last OV note recommendation:    Problem/s Identified this visit:   1. Demyelinating disease of central nervous system (HCC)    2. Seizures (HCC)    3. Compression fracture of L2 vertebra, sequela    4. Hypersomnia             Discussion plus Diagnostics & Treatment Orders:  Advised to go for the vertebroplasty  Otherwise continue other medications  For the sleepiness, recommend Sleep study but can do this through her PCP     Sleep study        (x) Discussed potential side effects of any treatment relevant to above.  Includes explanation of tests as necessary.           Patient understands that if needed, based on condition and or test results, follow up will be readjusted        Jose Alberto Brown MD

## 2025-06-25 ENCOUNTER — TELEPHONE (OUTPATIENT)
Dept: NEUROLOGY | Facility: CLINIC | Age: 65
End: 2025-06-25

## 2025-06-25 NOTE — TELEPHONE ENCOUNTER
Patient stated her current PCP not available anymore and looking for recommendations from Dr. Brown for a new PCP.    Patient currently lives in [place] and has [employer] PPO.    Please call patient to discuss and advise.

## 2025-06-26 NOTE — TELEPHONE ENCOUNTER
Awilda Chang Nurse (supporting Maryanne Celaya RN)54 minutes ago (2:30 PM)     SF  Hey there not to be a bother however I did call and two of the doctors above were no longer seeing new patients and one was very far out scheduling that they didn't even show anything available. I guess I'll just put it out there again if he knows any other doctors they don't have to be with [facility] actually I've had some issues with [facility] so if there's someone else even with Alec Locke that would be great but if that's his list that would be it. Thank you for your help I truly appreciate it.

## 2025-06-27 RX ORDER — LEVETIRACETAM 750 MG/1
1500 TABLET, FILM COATED, EXTENDED RELEASE ORAL DAILY
Qty: 180 TABLET | Refills: 3 | Status: SHIPPED | OUTPATIENT
Start: 2025-06-27

## 2025-06-27 NOTE — TELEPHONE ENCOUNTER
Medication: Levetiracetam 750MG Oral Tab      Date of last refill: 02/11/2025 (#180/0)  [place]      Last office visit: 4/21/25  Due back to clinic per last office note:    Date next office visit scheduled:           Future Appointments   Date Time Provider Department Center   12/4/2025  2:40 PM Jose Alberto Brown MD [facility]            Last OV note recommendation:     Problem/s Identified this visit:   1. Demyelinating disease of central nervous system (HCC)    2. Seizures (HCC)    3. Compression fracture of L2 vertebra, sequela    4. Hypersomnia             Discussion plus Diagnostics & Treatment Orders:  Advised to go for the vertebroplasty  Otherwise continue other medications  For the sleepiness, recommend Sleep study but can do this through her PCP     Sleep study        (x) Discussed potential side effects of any treatment relevant to above.  Includes explanation of tests as necessary.           Patient understands that if needed, based on condition and or test results, follow up will be readjusted        Jose Alberto Brown MD

## 2025-07-04 DIAGNOSIS — F90.8 OTHER SPECIFIED ATTENTION DEFICIT HYPERACTIVITY DISORDER (ADHD): ICD-10-CM

## 2025-07-07 RX ORDER — DEXTROAMPHETAMINE SACCHARATE, AMPHETAMINE ASPARTATE, DEXTROAMPHETAMINE SULFATE AND AMPHETAMINE SULFATE 5; 5; 5; 5 MG/1; MG/1; MG/1; MG/1
20 TABLET ORAL EVERY MORNING
Qty: 30 TABLET | Refills: 0 | Status: SHIPPED | OUTPATIENT
Start: 2025-07-07

## 2025-07-07 NOTE — TELEPHONE ENCOUNTER
Medication: amphetamine-dextroamphetamine 20 MG Oral Tab      Date of last refill: 05/28/25 (#30/0)  [place]25     Last office visit: 04/21/25  Due back to clinic per last office note:  12/21/2025  Date next office visit scheduled:    Future Appointments   Date Time Provider Department Center   12/4/2025  2:40 PM Jose Alberto Brown MD [facility]           Last OV note recommendation:    Advised to go for the vertebroplasty  Otherwise continue other medications  For the sleepiness, recommend Sleep study but can do this through her PCP     Sleep study        (x) Discussed potential side effects of any treatment relevant to above.  Includes explanation of tests as necessary.           Patient understands that if needed, based on condition and or test results, follow up will be readjusted

## 2025-08-07 DIAGNOSIS — F90.8 OTHER SPECIFIED ATTENTION DEFICIT HYPERACTIVITY DISORDER (ADHD): ICD-10-CM

## 2025-08-07 RX ORDER — DEXTROAMPHETAMINE SACCHARATE, AMPHETAMINE ASPARTATE, DEXTROAMPHETAMINE SULFATE AND AMPHETAMINE SULFATE 5; 5; 5; 5 MG/1; MG/1; MG/1; MG/1
20 TABLET ORAL EVERY MORNING
Qty: 30 TABLET | Refills: 0 | Status: SHIPPED | OUTPATIENT
Start: 2025-08-07

## (undated) NOTE — LETTER
[facility] [address], [address] [facility]    Consent for Anesthesia   1.    Maria Fernanda Figueroa agree to be cared for by an anesthesiologist, who is specially trained to monitor me and give me medicine to put me to sleep or keep me comfortab vision, nerves, or muscles and in extremely rare instances death. 5. My doctor has explained to me other choices available to me for my care (alternatives).   6. Pregnant Patients (“epidural”):  I understand that the risks of having an epidural (medicine g

## (undated) NOTE — LETTER
To: Dr. Lisette Torres  Patient Name: Jo Ann ANGUIANO-Age / Sex: 1960-A: 59 y  female   Medical Records: [id number] CSN: [id number]    Request for History & Physical for Radiology Procedure at [facility]    The above patient is scheduled to have a procedure performed in Radiology. In order for the procedure to be performed safely, a comprehensive History & Physical, to include the Review of Systems, is required within 30 days of the scheduled appointment. Procedure:  MRI brain w/ and w/o contrast  Date Scheduled: 2023  Ordered by (Ordering Physician):  Dr. Althea Riley    Please FAX the completed H&P to [facility] Radiology at [phone]. For Questions: Call [facility] Radiology [phone]    If you cannot provide us with a comprehensive History & Physical prior to this appointment, you will need to cancel and reschedule the appointment by calling Central Scheduling [phone]. Thank you.

## (undated) NOTE — LETTER
[facility] [address], [address] [facility]    Consent for Anesthesia   1.    Jeni Leonard agree to be cared for by a physician anesthesiologist alone and/or with a nurse anesthetist, who is specially trained to monitor me and · Rare risks include: remembering what happened during my procedure, allergic reactions to medications, injury to my airway, heart, lungs, vision, nerves, or muscles and in extremely rare instances death.   5. My doctor has explained to me other choices lauren Patient Name: Devere Opitz     : 1960                 Printed: 2020         Medical Record #: [id number]                                            Page 1 of 1

## (undated) NOTE — LETTER
2019      Regardin [facility], [facility] 1960    To Whom it May Concern:    MsLalo Benny Zarate is following with my office for the treatment of multiple sclerosis and history of non-epileptic episodes.  She is cleared from a neurological teresa

## (undated) NOTE — LETTER
OUTSIDE TESTING RESULT REQUEST     IMPORTANT: FOR YOUR IMMEDIATE ATTENTION  Please FAX all test results listed below to: [phone]     Testing already done on or about: 2023    * * * * If testing is NOT complete, arrange with patient A.S.A.P. * * * *      Patient Name: Jann LYONS  Surgery Date: 2023  Medical Record: [id number]  CSN: [id number]  : 1960 - A: 59 y     Sex: female    Procedure: MRI brain and w/ and w/o contrast w/ sedation    Ordered per Dr. Prisca Dyer           The following Testing and Time Line are REQUIRED PER ANESTHESIA     EKG READ AND SIGNED WITHIN   90 days  BMP (requires 4 hour fast) within  90 days      Thank You,   Sent Marii Merchant RN